# Patient Record
Sex: MALE | Race: WHITE | NOT HISPANIC OR LATINO | Employment: FULL TIME | URBAN - METROPOLITAN AREA
[De-identification: names, ages, dates, MRNs, and addresses within clinical notes are randomized per-mention and may not be internally consistent; named-entity substitution may affect disease eponyms.]

---

## 2017-10-03 ENCOUNTER — OFFICE VISIT - HEALTHEAST (OUTPATIENT)
Dept: INTERNAL MEDICINE | Facility: CLINIC | Age: 37
End: 2017-10-03

## 2017-10-03 ENCOUNTER — COMMUNICATION - HEALTHEAST (OUTPATIENT)
Dept: INTERNAL MEDICINE | Facility: CLINIC | Age: 37
End: 2017-10-03

## 2017-10-03 DIAGNOSIS — M79.641 HAND PAIN, RIGHT: ICD-10-CM

## 2017-10-03 ASSESSMENT — MIFFLIN-ST. JEOR: SCORE: 2000.81

## 2017-12-01 ENCOUNTER — OFFICE VISIT - HEALTHEAST (OUTPATIENT)
Dept: INTERNAL MEDICINE | Facility: CLINIC | Age: 37
End: 2017-12-01

## 2017-12-01 DIAGNOSIS — E66.811 OBESITY (BMI 30.0-34.9): ICD-10-CM

## 2017-12-01 DIAGNOSIS — Z00.00 ROUTINE GENERAL MEDICAL EXAMINATION AT A HEALTH CARE FACILITY: ICD-10-CM

## 2017-12-01 DIAGNOSIS — R06.83 SNORING: ICD-10-CM

## 2017-12-01 DIAGNOSIS — G89.29 CHRONIC HEADACHES: ICD-10-CM

## 2017-12-01 DIAGNOSIS — R51.9 CHRONIC HEADACHES: ICD-10-CM

## 2017-12-01 LAB
CHOLEST SERPL-MCNC: 150 MG/DL
FASTING STATUS PATIENT QL REPORTED: ABNORMAL
HDLC SERPL-MCNC: 38 MG/DL
LDLC SERPL CALC-MCNC: 92 MG/DL
TRIGL SERPL-MCNC: 98 MG/DL

## 2017-12-01 ASSESSMENT — MIFFLIN-ST. JEOR: SCORE: 2032.56

## 2017-12-02 ENCOUNTER — COMMUNICATION - HEALTHEAST (OUTPATIENT)
Dept: INTERNAL MEDICINE | Facility: CLINIC | Age: 37
End: 2017-12-02

## 2017-12-02 ENCOUNTER — AMBULATORY - HEALTHEAST (OUTPATIENT)
Dept: INTERNAL MEDICINE | Facility: CLINIC | Age: 37
End: 2017-12-02

## 2017-12-02 DIAGNOSIS — D64.9 ANEMIA: ICD-10-CM

## 2018-07-25 ENCOUNTER — COMMUNICATION - HEALTHEAST (OUTPATIENT)
Dept: SCHEDULING | Facility: CLINIC | Age: 38
End: 2018-07-25

## 2018-07-26 ENCOUNTER — OFFICE VISIT - HEALTHEAST (OUTPATIENT)
Dept: INTERNAL MEDICINE | Facility: CLINIC | Age: 38
End: 2018-07-26

## 2018-07-26 DIAGNOSIS — L03.90 CELLULITIS: ICD-10-CM

## 2018-12-13 ENCOUNTER — OFFICE VISIT - HEALTHEAST (OUTPATIENT)
Dept: INTERNAL MEDICINE | Facility: CLINIC | Age: 38
End: 2018-12-13

## 2018-12-13 ENCOUNTER — COMMUNICATION - HEALTHEAST (OUTPATIENT)
Dept: INTERNAL MEDICINE | Facility: CLINIC | Age: 38
End: 2018-12-13

## 2018-12-13 DIAGNOSIS — R53.82 CHRONIC FATIGUE: ICD-10-CM

## 2018-12-13 DIAGNOSIS — R06.83 SNORING: ICD-10-CM

## 2018-12-13 DIAGNOSIS — G44.229 CHRONIC TENSION-TYPE HEADACHE, NOT INTRACTABLE: ICD-10-CM

## 2018-12-13 DIAGNOSIS — Z00.00 ROUTINE GENERAL MEDICAL EXAMINATION AT A HEALTH CARE FACILITY: ICD-10-CM

## 2018-12-13 DIAGNOSIS — E66.811 OBESITY (BMI 30.0-34.9): ICD-10-CM

## 2018-12-13 DIAGNOSIS — D64.9 ANEMIA, UNSPECIFIED TYPE: ICD-10-CM

## 2018-12-13 LAB
ALBUMIN SERPL-MCNC: 4.3 G/DL (ref 3.5–5)
ALP SERPL-CCNC: 38 U/L (ref 45–120)
ALT SERPL W P-5'-P-CCNC: 35 U/L (ref 0–45)
ANION GAP SERPL CALCULATED.3IONS-SCNC: 8 MMOL/L (ref 5–18)
AST SERPL W P-5'-P-CCNC: 20 U/L (ref 0–40)
BILIRUB SERPL-MCNC: 0.7 MG/DL (ref 0–1)
BUN SERPL-MCNC: 10 MG/DL (ref 8–22)
CALCIUM SERPL-MCNC: 9.4 MG/DL (ref 8.5–10.5)
CHLORIDE BLD-SCNC: 104 MMOL/L (ref 98–107)
CHOLEST SERPL-MCNC: 171 MG/DL
CO2 SERPL-SCNC: 26 MMOL/L (ref 22–31)
CREAT SERPL-MCNC: 0.75 MG/DL (ref 0.7–1.3)
ERYTHROCYTE [DISTWIDTH] IN BLOOD BY AUTOMATED COUNT: 12 % (ref 11–14.5)
FASTING STATUS PATIENT QL REPORTED: YES
FERRITIN SERPL-MCNC: 207 NG/ML (ref 27–300)
GFR SERPL CREATININE-BSD FRML MDRD: >60 ML/MIN/1.73M2
GLUCOSE BLD-MCNC: 97 MG/DL (ref 70–125)
HCT VFR BLD AUTO: 42.4 % (ref 40–54)
HDLC SERPL-MCNC: 46 MG/DL
HGB BLD-MCNC: 14.3 G/DL (ref 14–18)
IRON SATN MFR SERPL: 44 % (ref 20–50)
IRON SERPL-MCNC: 134 UG/DL (ref 42–175)
LDLC SERPL CALC-MCNC: 112 MG/DL
MCH RBC QN AUTO: 30.7 PG (ref 27–34)
MCHC RBC AUTO-ENTMCNC: 33.9 G/DL (ref 32–36)
MCV RBC AUTO: 91 FL (ref 80–100)
PLATELET # BLD AUTO: 317 THOU/UL (ref 140–440)
PMV BLD AUTO: 7.6 FL (ref 7–10)
POTASSIUM BLD-SCNC: 3.9 MMOL/L (ref 3.5–5)
PROT SERPL-MCNC: 7.2 G/DL (ref 6–8)
RBC # BLD AUTO: 4.67 MILL/UL (ref 4.4–6.2)
SODIUM SERPL-SCNC: 138 MMOL/L (ref 136–145)
TIBC SERPL-MCNC: 308 UG/DL (ref 313–563)
TRANSFERRIN SERPL-MCNC: 247 MG/DL (ref 212–360)
TRIGL SERPL-MCNC: 67 MG/DL
TSH SERPL DL<=0.005 MIU/L-ACNC: 1.78 UIU/ML (ref 0.3–5)
VIT B12 SERPL-MCNC: 430 PG/ML (ref 213–816)
WBC: 8.5 THOU/UL (ref 4–11)

## 2018-12-13 ASSESSMENT — MIFFLIN-ST. JEOR: SCORE: 2118.74

## 2019-03-07 ENCOUNTER — OFFICE VISIT - HEALTHEAST (OUTPATIENT)
Dept: INTERNAL MEDICINE | Facility: CLINIC | Age: 39
End: 2019-03-07

## 2019-03-07 ENCOUNTER — COMMUNICATION - HEALTHEAST (OUTPATIENT)
Dept: SCHEDULING | Facility: CLINIC | Age: 39
End: 2019-03-07

## 2019-03-07 DIAGNOSIS — J10.1 INFLUENZA A: ICD-10-CM

## 2019-03-07 DIAGNOSIS — R05.9 COUGH: ICD-10-CM

## 2019-03-07 LAB
FLUAV AG SPEC QL IA: ABNORMAL
FLUBV AG SPEC QL IA: ABNORMAL

## 2019-03-07 ASSESSMENT — MIFFLIN-ST. JEOR: SCORE: 2123.28

## 2019-03-08 ENCOUNTER — AMBULATORY - HEALTHEAST (OUTPATIENT)
Dept: INTERNAL MEDICINE | Facility: CLINIC | Age: 39
End: 2019-03-08

## 2020-01-02 ENCOUNTER — OFFICE VISIT - HEALTHEAST (OUTPATIENT)
Dept: INTERNAL MEDICINE | Facility: CLINIC | Age: 40
End: 2020-01-02

## 2020-01-02 DIAGNOSIS — G89.29 CHRONIC ELBOW PAIN, RIGHT: ICD-10-CM

## 2020-01-02 DIAGNOSIS — Z13.220 ENCOUNTER FOR SCREENING FOR LIPOID DISORDERS: ICD-10-CM

## 2020-01-02 DIAGNOSIS — M25.521 CHRONIC ELBOW PAIN, RIGHT: ICD-10-CM

## 2020-01-02 DIAGNOSIS — R06.83 SNORING: ICD-10-CM

## 2020-01-02 DIAGNOSIS — E66.811 OBESITY (BMI 30.0-34.9): ICD-10-CM

## 2020-01-02 DIAGNOSIS — Z00.00 ROUTINE GENERAL MEDICAL EXAMINATION AT A HEALTH CARE FACILITY: ICD-10-CM

## 2020-01-02 LAB
ALBUMIN SERPL-MCNC: 4.3 G/DL (ref 3.5–5)
ALBUMIN UR-MCNC: NEGATIVE MG/DL
ALP SERPL-CCNC: 49 U/L (ref 45–120)
ALT SERPL W P-5'-P-CCNC: 23 U/L (ref 0–45)
ANION GAP SERPL CALCULATED.3IONS-SCNC: 12 MMOL/L (ref 5–18)
APPEARANCE UR: CLEAR
AST SERPL W P-5'-P-CCNC: 16 U/L (ref 0–40)
BACTERIA #/AREA URNS HPF: ABNORMAL HPF
BILIRUB SERPL-MCNC: 0.6 MG/DL (ref 0–1)
BILIRUB UR QL STRIP: NEGATIVE
BUN SERPL-MCNC: 8 MG/DL (ref 8–22)
CALCIUM SERPL-MCNC: 9.3 MG/DL (ref 8.5–10.5)
CHLORIDE BLD-SCNC: 105 MMOL/L (ref 98–107)
CHOLEST SERPL-MCNC: 171 MG/DL
CO2 SERPL-SCNC: 23 MMOL/L (ref 22–31)
COLOR UR AUTO: YELLOW
CREAT SERPL-MCNC: 0.76 MG/DL (ref 0.7–1.3)
ERYTHROCYTE [DISTWIDTH] IN BLOOD BY AUTOMATED COUNT: 12.2 % (ref 11–14.5)
FASTING STATUS PATIENT QL REPORTED: YES
GFR SERPL CREATININE-BSD FRML MDRD: >60 ML/MIN/1.73M2
GLUCOSE BLD-MCNC: 89 MG/DL (ref 70–125)
GLUCOSE UR STRIP-MCNC: NEGATIVE MG/DL
HCT VFR BLD AUTO: 40.5 % (ref 40–54)
HDLC SERPL-MCNC: 42 MG/DL
HGB BLD-MCNC: 14.2 G/DL (ref 14–18)
HGB UR QL STRIP: ABNORMAL
KETONES UR STRIP-MCNC: NEGATIVE MG/DL
LDLC SERPL CALC-MCNC: 112 MG/DL
LEUKOCYTE ESTERASE UR QL STRIP: NEGATIVE
MCH RBC QN AUTO: 31.2 PG (ref 27–34)
MCHC RBC AUTO-ENTMCNC: 35 G/DL (ref 32–36)
MCV RBC AUTO: 89 FL (ref 80–100)
NITRATE UR QL: NEGATIVE
PH UR STRIP: 6 [PH] (ref 5–8)
PLATELET # BLD AUTO: 291 THOU/UL (ref 140–440)
PMV BLD AUTO: 7.8 FL (ref 7–10)
POTASSIUM BLD-SCNC: 3.7 MMOL/L (ref 3.5–5)
PROT SERPL-MCNC: 7.3 G/DL (ref 6–8)
RBC # BLD AUTO: 4.54 MILL/UL (ref 4.4–6.2)
RBC #/AREA URNS AUTO: ABNORMAL HPF
SODIUM SERPL-SCNC: 140 MMOL/L (ref 136–145)
SP GR UR STRIP: 1.02 (ref 1–1.03)
SQUAMOUS #/AREA URNS AUTO: ABNORMAL LPF
TRIGL SERPL-MCNC: 83 MG/DL
UROBILINOGEN UR STRIP-ACNC: ABNORMAL
WBC #/AREA URNS AUTO: ABNORMAL HPF
WBC: 10.1 THOU/UL (ref 4–11)

## 2020-01-02 ASSESSMENT — MIFFLIN-ST. JEOR: SCORE: 2118.74

## 2020-01-03 ENCOUNTER — COMMUNICATION - HEALTHEAST (OUTPATIENT)
Dept: INTERNAL MEDICINE | Facility: CLINIC | Age: 40
End: 2020-01-03

## 2020-09-22 ENCOUNTER — COMMUNICATION - HEALTHEAST (OUTPATIENT)
Dept: INTERNAL MEDICINE | Facility: CLINIC | Age: 40
End: 2020-09-22

## 2020-10-01 ENCOUNTER — RECORDS - HEALTHEAST (OUTPATIENT)
Dept: ADMINISTRATIVE | Facility: OTHER | Age: 40
End: 2020-10-01

## 2021-01-07 ENCOUNTER — OFFICE VISIT - HEALTHEAST (OUTPATIENT)
Dept: INTERNAL MEDICINE | Facility: CLINIC | Age: 41
End: 2021-01-07

## 2021-01-07 DIAGNOSIS — Z00.00 ROUTINE GENERAL MEDICAL EXAMINATION AT A HEALTH CARE FACILITY: ICD-10-CM

## 2021-01-07 DIAGNOSIS — Z13.220 ENCOUNTER FOR SCREENING FOR LIPOID DISORDERS: ICD-10-CM

## 2021-01-07 DIAGNOSIS — E66.811 OBESITY (BMI 30.0-34.9): ICD-10-CM

## 2021-01-07 DIAGNOSIS — G89.29 CHRONIC PAIN OF RIGHT KNEE: ICD-10-CM

## 2021-01-07 DIAGNOSIS — M25.561 CHRONIC PAIN OF RIGHT KNEE: ICD-10-CM

## 2021-01-07 LAB
ALBUMIN SERPL-MCNC: 4.3 G/DL (ref 3.5–5)
ALBUMIN UR-MCNC: NEGATIVE MG/DL
ALP SERPL-CCNC: 44 U/L (ref 45–120)
ALT SERPL W P-5'-P-CCNC: 27 U/L (ref 0–45)
ANION GAP SERPL CALCULATED.3IONS-SCNC: 6 MMOL/L (ref 5–18)
APPEARANCE UR: CLEAR
AST SERPL W P-5'-P-CCNC: 19 U/L (ref 0–40)
BILIRUB SERPL-MCNC: 0.7 MG/DL (ref 0–1)
BILIRUB UR QL STRIP: NEGATIVE
BUN SERPL-MCNC: 11 MG/DL (ref 8–22)
CALCIUM SERPL-MCNC: 9.4 MG/DL (ref 8.5–10.5)
CHLORIDE BLD-SCNC: 103 MMOL/L (ref 98–107)
CHOLEST SERPL-MCNC: 166 MG/DL
CO2 SERPL-SCNC: 30 MMOL/L (ref 22–31)
COLOR UR AUTO: YELLOW
CREAT SERPL-MCNC: 0.79 MG/DL (ref 0.7–1.3)
ERYTHROCYTE [DISTWIDTH] IN BLOOD BY AUTOMATED COUNT: 11.5 % (ref 11–14.5)
FASTING STATUS PATIENT QL REPORTED: NORMAL
GFR SERPL CREATININE-BSD FRML MDRD: >60 ML/MIN/1.73M2
GLUCOSE BLD-MCNC: 92 MG/DL (ref 70–125)
GLUCOSE UR STRIP-MCNC: NEGATIVE MG/DL
HCT VFR BLD AUTO: 44.6 % (ref 40–54)
HDLC SERPL-MCNC: 40 MG/DL
HGB BLD-MCNC: 14.8 G/DL (ref 14–18)
HGB UR QL STRIP: NEGATIVE
KETONES UR STRIP-MCNC: NEGATIVE MG/DL
LDLC SERPL CALC-MCNC: 107 MG/DL
LEUKOCYTE ESTERASE UR QL STRIP: NEGATIVE
MCH RBC QN AUTO: 30.5 PG (ref 27–34)
MCHC RBC AUTO-ENTMCNC: 33.3 G/DL (ref 32–36)
MCV RBC AUTO: 92 FL (ref 80–100)
NITRATE UR QL: NEGATIVE
PH UR STRIP: 6 [PH] (ref 5–8)
PLATELET # BLD AUTO: 274 THOU/UL (ref 140–440)
PMV BLD AUTO: 7.6 FL (ref 7–10)
POTASSIUM BLD-SCNC: 4.2 MMOL/L (ref 3.5–5)
PROT SERPL-MCNC: 6.9 G/DL (ref 6–8)
RBC # BLD AUTO: 4.86 MILL/UL (ref 4.4–6.2)
SODIUM SERPL-SCNC: 139 MMOL/L (ref 136–145)
SP GR UR STRIP: 1.02 (ref 1–1.03)
TRIGL SERPL-MCNC: 93 MG/DL
UROBILINOGEN UR STRIP-ACNC: NORMAL
WBC: 8.4 THOU/UL (ref 4–11)

## 2021-01-07 ASSESSMENT — MIFFLIN-ST. JEOR: SCORE: 2111.94

## 2021-01-20 ENCOUNTER — RECORDS - HEALTHEAST (OUTPATIENT)
Dept: ADMINISTRATIVE | Facility: OTHER | Age: 41
End: 2021-01-20

## 2021-05-31 VITALS — HEIGHT: 72 IN | BODY MASS INDEX: 32.23 KG/M2 | WEIGHT: 238 LBS

## 2021-05-31 VITALS — WEIGHT: 231 LBS | BODY MASS INDEX: 31.29 KG/M2 | HEIGHT: 72 IN

## 2021-06-02 VITALS — HEIGHT: 72 IN | WEIGHT: 258 LBS | BODY MASS INDEX: 34.95 KG/M2

## 2021-06-02 VITALS — BODY MASS INDEX: 34.81 KG/M2 | HEIGHT: 72 IN | WEIGHT: 257 LBS

## 2021-06-04 VITALS
DIASTOLIC BLOOD PRESSURE: 84 MMHG | TEMPERATURE: 98 F | WEIGHT: 257 LBS | HEART RATE: 82 BPM | HEIGHT: 72 IN | OXYGEN SATURATION: 97 % | SYSTOLIC BLOOD PRESSURE: 130 MMHG | BODY MASS INDEX: 34.81 KG/M2

## 2021-06-04 NOTE — PATIENT INSTRUCTIONS - HE
Try Delsym cough syrup for current upper respiratory infection  Update me in 2 weeks if your right elbow is not much improved with Aleve twice daily for 10 days  Consider getting an annual flu shot from your pharmacy

## 2021-06-05 VITALS
HEIGHT: 72 IN | WEIGHT: 255.5 LBS | OXYGEN SATURATION: 99 % | DIASTOLIC BLOOD PRESSURE: 80 MMHG | HEART RATE: 73 BPM | BODY MASS INDEX: 34.61 KG/M2 | SYSTOLIC BLOOD PRESSURE: 128 MMHG

## 2021-06-11 NOTE — TELEPHONE ENCOUNTER
Patient's wife sent a My Chart message regarding her  in her chart. The following is the information from her chart:    Tomasz ZAFAR.  My  Dean is your patient.  He's been having some bad pain in his joints like his knee and shoulder. Can we set up an appointment? I tried calling but was on hold for 30 minutes    You have no upcoming appointments. Have him see another provider? Fit him in?     Fozia Blanco CMA    Please contact patient and help to schedule appointment with me or available provider if I have nothing open immediately    I called and spoke the Lisa and scheduled a VV with PCP. Patient did not want to wait as long as it was for a in person with PCP.    Fozia Blanco CMA

## 2021-06-13 NOTE — PROGRESS NOTES
Office Visit - Follow Up   Peewee Walsh   37 y.o. male    Date of Visit: 10/3/2017    Chief Complaint   Patient presents with     Hand Pain     right, hurts to grab things        Assessment and Plan   1. Hand pain, right  I suspect this is tendinitis or injury to 1 of his muscles or ligaments in his hand.  Less likely carpal tunnel or radicular pain.  Recommending Aleve twice daily for the next 7-10 days and to avoid any use.  He may also apply ice.  Orthopedic hand consult if no significant improvement in the next 72 hours.    No Follow-up on file.     History of Present Illness   This 37 y.o. old healthy male is here with pain in his right hand that began over the last 3 days.  He awoke with severe pain in his right hand 3 days ago.  It is localized to his palm just distal to his wrist in the midline.  Nothing radiating into his fingers.  The pain is worse when he is grabbing objects.  It hurts more with his fourth and fifth fingers then with his index finger and thumb.  No redness, warmth, or swelling.  No history of carpal tunnel syndrome.  He does have history of nerve injury involving right shoulder but those symptoms are very different from current.  He has not tried any NSAIDs.  He was using a circular saw the day prior to onset of symptoms.  He cannot think of any other injury.  No trauma.    Review of Systems: Denies shoulder pain.  No pain radiating down arm currently     Medications, Allergies and Problem List   Patient Active Problem List   Diagnosis     Chronic right shoulder pain     Acute cervical radiculopathy     Elevated blood pressure     Plantar fasciitis, bilateral     Left foot pain     Hand pain, right       He has a past surgical history that includes [place] tooth extraction.    No Known Allergies    Current Outpatient Prescriptions   Medication Sig Dispense Refill     multivitamin therapeutic (THERAGRAN) tablet Take 1 tablet by mouth daily.       No current facility-administered  medications for this visit.         Physical Exam   General Appearance:   Well-appearing male    /80 (Patient Site: Left Arm, Patient Position: Sitting, Cuff Size: Adult Regular)  Pulse (!) 104  Ht 6' (1.829 m)  Wt (!) 231 lb (104.8 kg)  SpO2 (!) 9%  BMI 31.33 kg/m2      Focal reproducible tenderness involving and just distal to wrist and midline.  No palpable mass  Neurologically intact.  Normal strength.  Negative Tinel sign.  No tenderness over ulnar nerve     Additional Information   Social History   Substance Use Topics     Smoking status: Never Smoker     Smokeless tobacco: Never Used     Alcohol use None              Earnest Hein MD

## 2021-06-14 NOTE — PROGRESS NOTES
MALE PREVENTATIVE EXAM    Assessment and Plan:     Patient has been advised of split billing requirements and indicates understanding: Yes    1. Routine general medical examination at a health care facility  Immunizations are reviewed and providing flu shot and updating his tetanus.  Living will has been discussed.  Non-smoker.  Uses alcohol in moderation.  Regular exercise discussed.  Colonoscopy at age 51 and begin prostate cancer screening at age 41.   He sees his ophthalmologist every year. Skin exam performed and recommending regular use of sunblock.  Will screen for diabetes with fasting glucose.  Checking fasting lipid profile.    - Comprehensive Metabolic Panel  - Urinalysis-UC if Indicated  - HM2(CBC w/o Differential)    2. Obesity (BMI 30.0-34.9)  Emphasizing the importance of diet modification and more regular exercise    3. Chronic pain of right knee  Evaluated by [facility] with abnormal MRI and discussing possible surgical intervention.  Will obtain MRI for review    4. Encounter for screening for lipoid disorders    - Lipid [place]- FASTING        Next follow up:  Return in 1 year (on 1/7/2022) for Annual physical.    Immunization Review  Adult Imm Review: Flu shot and TD    I discussed the following with the patient:   Adult Healthy Living: Importance of regular exercise  Healthy nutrition        Subjective:   Chief Complaint: Peewee Walhs is an 40 y.o. male here for a preventative health visit.    Patient has been advised of split billing requirements and indicates understanding: Yes  HPI: Here for his annual physical.  Chronic pain involving right knee.  Recent evaluation at [facility] with abnormal MRI and discussion underway regarding possible surgical intervention    Healthy Habits  Are you taking a daily aspirin? No  Do you typically exercising at least 40 min, 3-4 times per week?  Yes  Do you usually eat at least 4 servings of fruit and vegetables a day, include whole  grains and fiber and avoid regularly eating high fat foods? NO  Have you had an eye exam in the past two years? Yes  Do you see a dentist twice per year? NO  Do you have any concerns regarding sleep? No    Safety Screen    No data recorded    Review of Systems:  Please see above.  The rest of the review of systems are negative for all systems.     Cancer Screening     Patient has no health maintenance due at this time              History     Reviewed By Date/Time Sections Reviewed    Earnest Hein MD 1/7/2021  1:51 PM Tobacco, Alcohol, Drug Use, Family    Earnest Hein MD 1/7/2021  1:41 PM Medical    Bloch, Lisa M, CMA 1/7/2021  1:36 PM Tobacco            Objective:   Vital Signs:   Visit Vitals  /80   Pulse 73   Ht 6' (1.829 m)   Wt (!) 255 lb 8 oz (115.9 kg)   SpO2 99%   BMI 34.65 kg/m           PHYSICAL EXAM  EYES: Eyelids, conjunctiva, and sclera were normal. Pupils were normal. Cornea, iris, and lens were normal bilaterally.  HEAD, EARS, NOSE, MOUTH, AND THROAT: Head and face were normal. TMs and external auditory canals are normal  NECK: Neck appearance was normal. There were no neck masses and the thyroid was not enlarged and no nodules are felt.  No lymphadenopathy.  RESPIRATORY: Breathing pattern was normal and the chest moved symmetrically.   Lung sounds were normal and there were no rales or wheezes.  CARDIOVASCULAR: Heart rate and rhythm were normal.  S1 and S2 were normal and there were no extra sounds or murmurs. Peripheral pulses in arms and legs were normal.  Jugular venous pressure was normal.  There was no peripheral edema.  No carotid bruits.  GASTROINTESTINAL:  Bowel sounds were present.   Palpation detected no tenderness, mass, or enlarged organs.   GENITALIA: No penile or testicular lesions.  No inguinal hernia.  MUSCULOSKELETAL: Protuberance over right knee and patella tendon insertion.  Minimally tender.  LYMPHATIC: There were no enlarged nodes.  SKIN/HAIR/NAILS: Skin  color was normal.  Hair and nails were normal.There were no skin lesions.  NEUROLOGIC: The patient was alert and oriented to person, place, time, and circumstance. Speech was normal. Cranial nerves were normal. Motor strength was normal for age. The patient was normally coordinated.  Sensation intact.  PSYCHIATRIC:  Mood and affect were normal and the patient had normal recent and remote memory. The patient's judgment and insight were normal.           Medication List      as of January 7, 2021  2:13 PM     You have not been prescribed any medications.         Additional Screenings Completed Today:     The patient was counseled and encouraged to consider modifying their diet and eating habits. He was provided with information on recommended healthy diet options.

## 2021-06-14 NOTE — PROGRESS NOTES
Office Visit - Physical   ePewee Walsh   37 y.o.  male    Date of visit: 12/1/2017  Physician: Earnest Hein MD     Assessment and Plan   1. Routine general medical examination at a health care facility  Immunizations are reviewed and he declines having a flu shot.  Non-smoker.  Uses alcohol in moderation.  Regular exercise discussed.   He sees his ophthalmologist regularly.  Skin exam performed and recommending regular use of sunblock.   Will screen for diabetes with fasting glucose.  Checking fasting lipid profile.      - Lipid [place]  - Comprehensive Metabolic Panel  - Hemoglobin  - Urinalysis    2. Obesity (BMI 30.0-34.9)  Exclude hypothyroidism as he has a difficult time keeping the weight off.  He struggles with diet control.  He does use the Lose It Zulma.  Encouraging regular exercise.  - Thyroid Stimulating Hormone (TSH)    3. Chronic headaches  Consistent with tension headaches.  We discussed getting adequate sleep, maintaining good hydration, eating well, and decreasing stress.    4. Snoring  Nothing in his history to suggest sleep apnea.  I suggested that he ask his wife to pay attention to any apneic spells that may occur throughout the night.  This would increase our concern and would warrant a referral to the sleep clinic.    Return in about 1 year (around 12/1/2018) for Annual physical.     Chief Complaint   Chief Complaint   Patient presents with     Annual Exam        Patient Profile   Social History     Social History Narrative    , Lisa    No children    [employer]         Past Medical History   Patient Active Problem List   Diagnosis     Chronic right shoulder pain     Plantar fasciitis, bilateral     Obesity (BMI 30.0-34.9)     Chronic headaches     Snoring       Past Surgical History  He has a past surgical history that includes [place] tooth extraction.     History of Present Illness   This 37 y.o. old male is here for annual physical.  His wife has asked that he bring up  some concerns regarding occasional headaches and also problems with snoring.  She is worried about sleep apnea although no apneic spells have been mentioned during the night.  He feels relatively well rested in the morning and does not have hypersomnolence during the day.  The headaches are infrequent.  Usually resolves after some Tylenol.  No associated nausea and no vision changes.  Headaches are described as dull in nature.  There is some pain and tightness in his posterior neck as well.  He suspects related to stress.    Review of Systems: A comprehensive review of systems was negative except as noted.  General: No chronic fatigue, unexpected weight loss or weight gain, fevers, chills, or night sweats  Eyes: No significant change in vision.  Seeing ophthalmologist regularly.  ENT: No ear or sinus infections.  No change in hearing.  No tinnitus.  Respiratory: No wheezing, dyspnea on exertion, or chronic cough  Cardiovascular: No chest pain, palpitations, dizziness, or syncope.  No peripheral edema.  GI: No abdominal pain, reflux symptoms, dysphagia, nausea, vomiting, constipation, or diarrhea  : No change in frequency or incontinence.  No hematuria.  Skin: No new rashes or lesions.  Neurologic: Intermittent headaches.  No dizziness.  Musculoskeletal: No muscle or joint pain.  Lymphatic: No swollen lymph nodes  Psychiatric: No depression, anxiety, or sleep disorder.       Medications and Allergies   Current Outpatient Prescriptions   Medication Sig Dispense Refill     multivitamin therapeutic (THERAGRAN) tablet Take 1 tablet by mouth daily.       No current facility-administered medications for this visit.      No Known Allergies     Family and Social History   Family History   Problem Relation Age of Onset     Hypertension Mother      Heart failure Maternal Grandfather      Heart attack Paternal Grandmother      early 60s        Social History   Substance Use Topics     Smoking status: Never Smoker      Smokeless tobacco: Never Used     Alcohol use Yes      Comment: 1-2/ month        Physical Exam   General Appearance:   Overweight but otherwise well-appearing male    /80 (Patient Site: Left Arm, Patient Position: Sitting, Cuff Size: Adult Large)  Pulse 78  Ht 6' (1.829 m)  Wt (!) 238 lb (108 kg)  SpO2 95%  BMI 32.28 kg/m2    EYES: Eyelids, conjunctiva, and sclera were normal. Pupils were normal. Cornea, iris, and lens were normal bilaterally.  HEAD, EARS, NOSE, MOUTH, AND THROAT: Head and face were normal. Nose appearance was normal and there was no discharge. Oropharynx was normal.  NECK: Neck appearance was normal. There were no neck masses and the thyroid was not enlarged and no nodules are felt.  No lymphadenopathy.  RESPIRATORY: Breathing pattern was normal and the chest moved symmetrically.  Percussion/auscultatory percussion was normal.  Lung sounds were normal and there were no rales or wheezes.  CARDIOVASCULAR: Heart rate and rhythm were normal.  S1 and S2 were normal and there were no extra sounds or murmurs. Peripheral pulses in arms and legs were normal.  Jugular venous pressure was normal.  There was no peripheral edema.    GASTROINTESTINAL: The abdomen was normal in contour.  Bowel sounds were present.  Percussion detected no organ enlargement or tenderness.  Palpation detected no tenderness, mass, or enlarged organs.   GENITALIA: No penile or testicular lesions.  No inguinal hernia.  MUSCULOSKELETAL: Skeletal configuration was normal and muscle mass was normal for age. Joint appearance was overall normal.  LYMPHATIC: There were no enlarged nodes.  SKIN/HAIR/NAILS: Skin color was normal.  There were no skin lesions.  Hair and nails were normal.  NEUROLOGIC: The patient was alert and oriented to person, place, time, and circumstance. Speech was normal. Cranial nerves were normal. Motor strength was normal for age. The patient was normally coordinated.  Sensation intact.  PSYCHIATRIC:  Mood  and affect were normal and the patient had normal recent and remote memory. The patient's judgment and insight were normal.       Additional Information        Earnest Hein MD  Internal Medicine  Contact me at [phone]

## 2021-06-16 PROBLEM — E66.811 OBESITY (BMI 30.0-34.9): Status: ACTIVE | Noted: 2017-12-01

## 2021-06-16 PROBLEM — G89.29 CHRONIC HEADACHES: Status: ACTIVE | Noted: 2017-12-01

## 2021-06-16 PROBLEM — R53.82 CHRONIC FATIGUE: Status: ACTIVE | Noted: 2018-12-13

## 2021-06-16 PROBLEM — M25.561 CHRONIC PAIN OF RIGHT KNEE: Status: ACTIVE | Noted: 2021-01-07

## 2021-06-16 PROBLEM — R51.9 CHRONIC HEADACHES: Status: ACTIVE | Noted: 2017-12-01

## 2021-06-16 PROBLEM — R06.83 SNORING: Status: ACTIVE | Noted: 2017-12-01

## 2021-06-16 PROBLEM — G89.29 CHRONIC PAIN OF RIGHT KNEE: Status: ACTIVE | Noted: 2021-01-07

## 2021-06-18 NOTE — PATIENT INSTRUCTIONS - HE
Patient Instructions by Earnest Hein MD at 1/7/2021  1:40 PM     Author: Earnest Hein MD Service: -- Author Type: Physician    Filed: 1/7/2021  2:07 PM Encounter Date: 1/7/2021 Status: Addendum    : Earnest Hein MD (Physician)    Related Notes: Original Note by Earnest Hein MD (Physician) filed at 1/7/2021  2:06 PM         Patient Education   Understanding USDA MyPlate  The USDA (US Department of Agriculture) has guidelines to help you make healthy food choices. These are called MyPlate. MyPlate shows the food groups that make up healthy meals using the image of a place setting. Before you eat, think about the healthiest choices for what to put onto your plate or into your cup or bowl. To learn more about building a healthy plate, visit www.choosemyplate.gov.       The Food Groups    Fruits: Any fruit or 100% fruit juice counts as part of the Fruit Group. Fruits may be fresh, canned, frozen, or dried, and may be whole, cut-up, or pureed. Make half your plate fruits and vegetables.    Vegetables: Any vegetable or 100% vegetable juice counts as a member of the Vegetable Group. Vegetables may be fresh, frozen, canned, or dried. They can be served raw or cooked and may be whole, cut-up, or mashed. Make half your plate fruits and vegetables.     Grains: All foods made from grains are part of the Grains Group. These include wheat, rice, oats, cornmeal, and barley such as bread, pasta, oatmeal, cereal, tortillas, and grits. Grains should be no more than a quarter of your plate. At least half of your grains should be whole grains.    Protein: This group includes meat, poultry, seafood, beans and peas, eggs, processed soy products (like tofu), nuts (including nut butters), and seeds. Make protein choices no more than a quarter of your plate. Meat and poultry choices should be lean or low fat.    Dairy: All fluid milk products and foods made from milk that contain calcium, like yogurt  and cheese are part of the Dairy Group. (Foods that have little calcium, such as cream, butter, and cream cheese, are not part of the group.) Most dairy choices should be low-fat or fat-free.    Oils: These are fats that are liquid at room temperature. They include canola, corn, olive, soybean, and sunflower oil. Foods that are mainly oil include mayonnaise, certain salad dressings, and soft margarines. You should have only 5 to 7 teaspoons of oils a day. You probably already get this much from the food you eat.  Use [employer] [place] to Help Build Your Meals  The [employer] can help you plan and track your meals and activity. You can look up individual foods to see or compare their nutritional value. You can get guidelines for what and how much you should eat. You can compare your food choices. And you can assess personal physical activities and see ways you can improve. Go to www.WISHI.gov/[employer]/.    5177-1562 [employer]. [address] [place] [address]. All rights reserved. This information is not intended as a substitute for professional medical care. Always follow your healthcare professional's instructions.             Patient Education     Exercise for a Healthier Heart     Exercise with a friend. When activity is fun, you're more likely to stick with it.   You may wonder how you can improve the health of your heart. If youre thinking about exercise, youre on the right track. You dont need to become an athlete. But you do need a certain amount of brisk exercise to help strengthen your heart. If you have been diagnosed with a heart condition, your healthcare provider may advise exercise to help stabilize your condition. To help make exercise a habit, choose safe, fun activities.  Before you start  Check with your healthcare provider before starting an exercise program. This is especially important if you have not been active for a while. It's also important if you have a  long-term (chronic) health problem such as heart disease, diabetes, or obesity. Or if you are at high risk for having these problems.  Why exercise?  Exercising regularly offers many healthy rewards. It can help you do all of the following:    Improve your blood cholesterol level to help prevent further heart trouble    Lower your blood pressure to help prevent a stroke or heart attack    Control diabetes, or reduce your risk of getting this disease    Improve your heart and lung function    Reach and stay at a healthy weight    Make your muscles stronger so you can stay active    Prevent falls and fractures by slowing the loss of bone mass (osteoporosis)    Manage stress better    Reduce your blood pressure    Improve your sense of self and your body image  Exercise tips      Ease into your routine. Set small goals. Then build on them. If you are not sure what your activity level should be, talk with your healthcare provider first before starting an exercise routine.    Exercise on most days. Aim for a total of 150 minutes (2 hours and 30 minutes) or more of moderate-intensity aerobic activity each week. Or 75 minutes (1 hour and 15 minutes) or more of vigorous-intensity aerobic activity each week. Or try for a combination of both. Moderate activity means that you breathe heavier and your heart rate increases but you can still talk. Think about doing 40 minutes of moderate exercise, 3 to 4 times a week. For best results, activity should last for about 40 minutes to lower blood pressure and cholesterol. It's OK to work up to the 40-minute period over time. Examples of moderate-intensity activity are walking 1 mile in 15 minutes. Or doing 30 to 45 minutes of yard work.    Step up your daily activity level. Along with your exercise program, try being more active the whole day. Walk instead of drive. Or park further away so that you take more steps each day. Do more household tasks or yard work. You may not be able to  meet the advised mount of physical activity. But doing some moderate- or vigorous-intensity aerobic activity can help reduce your risk for heart disease. Your healthcare provider can help you figure out what is best for you.    Choose 1 or more activities you enjoy. Walking is one of the easiest things you can do. You can also try swimming, riding a bike, dancing, or taking an exercise class.    When to call your healthcare provider  Call your healthcare provider if you have any of these:     Chest pain or feel dizzy or lightheaded    Burning, tightness, pressure, or heaviness in your chest, neck, shoulders, back, or arms    Abnormal shortness of breath    More joint or muscle pain    A very fast or irregular heartbeat (palpitations)  Date Last Reviewed: 7/1/2019 2000-2019 [employer]. [address] [place] [address], [place] [address]. All rights reserved. This information is not intended as a substitute for professional medical care. Always follow your healthcare professional's instructions.

## 2021-06-19 NOTE — PROGRESS NOTES
1. Cellulitis        Medications Ordered   Medications     cephalexin (KEFLEX) 500 MG capsule     Sig: Take 1 capsule (500 mg total) by mouth 3 (three) times a day for 10 days.     Dispense:  30 capsule     Refill:  0      Plan: Antibiotics given as noted above.  I told him to follow-up if things get worse, meaning he gets a high fever, worsening redness, spreading proximally, or if any drainage develops.  I think this will take care of it nicely, and told him that he would be better off elevating his leg as well, but he has to work the next couple of days.  I offered him some time off but he wishes to continue working if he can.    Subjective: 38-year-old male who is here today with leg redness.  He states that he had a bite of some sort on the right anterior knee a few weeks ago that has persisted however has gotten smaller.  Yesterday, he noticed that his anterior shin was getting quite red and warm and tender to the touch.  This got worse in the evening called in and make an appointment to be seen today.  No fevers or chills.  No drainage.  There is nothing proximal to the knee.    Objective: Well-appearing male in no acute distress.  Vital signs as noted.  Chest clear heart regular rate and rhythm.  The right leg shows the round bite just inferior to the patella margin, that seems to be fine, but that he has an area of cellulitis that extends down the shin on that right leg is red and tender and warm.

## 2021-06-20 NOTE — LETTER
Letter by Earnest Hein MD at      Author: Earnest Hein MD Service: -- Author Type: --    Filed:  Encounter Date: 1/3/2020 Status: Signed         Peewee Walsh  [id number] [address] [address]             January 3, 2020         Dear Dean,    Below are the results from your recent visit:    Resulted Orders   Lipid [place]- FASTING   Result Value Ref Range    Cholesterol 171 <=199 mg/dL    Triglycerides 83 <=149 mg/dL    HDL Cholesterol 42 >=40 mg/dL    LDL Calculated 112 <=129 mg/dL    Patient Fasting > 8hrs? Yes    Comprehensive Metabolic Panel   Result Value Ref Range    Sodium 140 136 - 145 mmol/L    Potassium 3.7 3.5 - 5.0 mmol/L    Chloride 105 98 - 107 mmol/L    CO2 23 22 - 31 mmol/L    Anion Gap, Calculation 12 5 - 18 mmol/L    Glucose 89 70 - 125 mg/dL    BUN 8 8 - 22 mg/dL    Creatinine 0.76 0.70 - 1.30 mg/dL    GFR MDRD Af Amer >60 >60 mL/min/1.73m2    GFR MDRD Non Af Amer >60 >60 mL/min/1.73m2    Bilirubin, Total 0.6 0.0 - 1.0 mg/dL    Calcium 9.3 8.5 - 10.5 mg/dL    Protein, Total 7.3 6.0 - 8.0 g/dL    Albumin 4.3 3.5 - 5.0 g/dL    Alkaline Phosphatase 49 45 - 120 U/L    AST 16 0 - 40 U/L    ALT 23 0 - 45 U/L    Narrative    Fasting Glucose reference range is 70-99 mg/dL per  American Diabetes Association (ADA) guidelines.   HM2(CBC w/o Differential)   Result Value Ref Range    WBC 10.1 4.0 - 11.0 thou/uL    RBC 4.54 4.40 - 6.20 mill/uL    Hemoglobin 14.2 14.0 - 18.0 g/dL    Hematocrit 40.5 40.0 - 54.0 %    MCV 89 80 - 100 fL    MCH 31.2 27.0 - 34.0 pg    MCHC 35.0 32.0 - 36.0 g/dL    RDW 12.2 11.0 - 14.5 %    Platelets 291 140 - 440 thou/uL    MPV 7.8 7.0 - 10.0 fL   Urinalysis-UC if Indicated   Result Value Ref Range    Color, UA Yellow Colorless, Yellow, Straw, Light Yellow    Clarity, UA Clear Clear    Glucose, UA Negative Negative    Bilirubin, UA Negative Negative    Ketones, UA Negative Negative    Specific Gravity, UA 1.020 1.005 - 1.030    Blood, UA Trace (!)  Negative    pH, UA 6.0 5.0 - 8.0    Protein, UA Negative Negative mg/dL    Urobilinogen, UA 0.2 E.U./dL 0.2 E.U./dL, 1.0 E.U./dL    Nitrite, UA Negative Negative    Leukocytes, UA Negative Negative    Bacteria, UA None Seen None Seen hpf    RBC, UA 0-2 None Seen, 0-2 hpf    WBC, UA None Seen None Seen, 0-5 hpf    Squam Epithel, UA None Seen None Seen, 0-5 lpf    Narrative    UC not indicated       Your labs are looking good.  Cholesterol is excellent.  No diabetes with fasting glucose of 89.  Normal kidney function and normal liver studies.  No anemia.  Normal urinalysis.    Please call with questions or contact us using [employer].    Sincerely,        Electronically signed by Earnest Hein MD

## 2021-06-22 NOTE — PROGRESS NOTES
MALE PREVENTATIVE EXAM    Assessment and Plan:       1. Routine general medical examination at a health care facility  Past, family, and social history reviewed and updated.  Healthy habits discussed.  Appropriate preventative measures and counseling addressed.  Recommended screening tests discussed and ordered.  - Lipid [place]    2. Obesity (BMI 30.0-34.9)  Counseled regarding importance of weight loss to start watching calories more carefully and to get back into more regular exercise.    3. Anemia, unspecified type  Mild anemia noted last year.  Denies melena or hematochezia.  Check appropriate studies  - HM2(CBC w/o Differential)  - Iron and Transferrin Iron Binding Capacity  - Ferritin  - Vitamin B12    4. Chronic fatigue  He has noticed himself slightly more tired.  May be related to longer work hours.  In addition to rechecking hemoglobin, check appropriate metabolic studies  - Comprehensive Metabolic Panel  - Thyroid Stimulating Hormone (TSH)    5. Chronic tension-type headache, not intractable  Stable    6. Snoring  No other symptoms to suggest sleep apnea but will need to monitor as he is at increased risk        Next follow up:  Return in 1 year (on 12/13/2019) for Annual physical.    Immunization Review  Adult Imm Review: Declines immunizations today        I discussed the following with the patient:   Adult Healthy Living: Importance of regular exercise  Healthy nutrition  Weight loss referral options     The patient was counseled and encouraged to consider modifying their diet and eating habits. He was provided with information on recommended healthy diet options.        Subjective:   Chief Complaint: Peewee Walsh is an 38 y.o. male here for a preventative health visit.     HPI: Struggling to keep his weight under control.  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  Has noticed some mild fatigue.  Diagnosed with pneumonia earlier this year and also  cellulitis.  Both resolved with appropriate treatment.    Healthy Habits  Are you taking a daily aspirin? No  Do you typically exercising at least 40 min, 3-4 times per week?  Yes  Do you usually eat at least 4 servings of fruit and vegetables a day, include whole grains and fiber and avoid regularly eating high fat foods? NO  Have you had an eye exam in the past two years? Yes  Do you see a dentist twice per year? NO  Do you have any concerns regarding sleep? YES    Safety Screen  If you own firearms, are they secured in a locked gun cabinet or with trigger locks? The patient declines to answer  Do you feel you are safe where you are living?: Yes (12/13/2018 11:24 AM)  Do you feel you are safe in your relationship(s)?: Yes (12/13/2018 11:24 AM)      Review of Systems:  Please see above.  The rest of the review of systems are negative for all systems.     Cancer Screening     Patient has no health maintenance due at this time              History     Reviewed By Date/Time Sections Reviewed    Earnest Hein MD 12/13/2018 11:41 AM Social Documentation    Margie Rosario CMA 12/13/2018 11:25 AM Tobacco, Alcohol, Drug Use, Sexual Activity            Objective:   Vital Signs:   Visit Vitals  /84 (Patient Site: Left Arm, Patient Position: Sitting, Cuff Size: Adult Large)   Pulse 85   Ht 6' (1.829 m)   Wt (!) 257 lb (116.6 kg)   SpO2 96%   BMI 34.86 kg/m           PHYSICAL EXAM  EYES: Eyelids, conjunctiva, and sclera were normal. Pupils were normal. Cornea, iris, and lens were normal bilaterally.  HEAD, EARS, NOSE, MOUTH, AND THROAT: Head and face were normal. Nose appearance was normal and there was no discharge. Oropharynx was normal.  NECK: Neck appearance was normal. There were no neck masses and the thyroid was not enlarged and no nodules are felt.  No lymphadenopathy.  RESPIRATORY: Breathing pattern was normal and the chest moved symmetrically.  Percussion/auscultatory percussion was normal.  Lung  sounds were normal and there were no rales or wheezes.  CARDIOVASCULAR: Heart rate and rhythm were normal.  S1 and S2 were normal and there were no extra sounds or murmurs. Peripheral pulses in arms and legs were normal.  Jugular venous pressure was normal.  There was no peripheral edema.  No carotid bruits.  GASTROINTESTINAL: The abdomen was normal in contour.  Bowel sounds were present.   Palpation detected no tenderness, mass, or enlarged organs.   MUSCULOSKELETAL: Skeletal configuration was normal and muscle mass was normal for age. Joint appearance was overall normal.  LYMPHATIC: There were no enlarged nodes.  SKIN/HAIR/NAILS: Skin color was normal.  Hair and nails were normal.There were no skin lesions.  NEUROLOGIC: The patient was alert and oriented to person, place, time, and circumstance. Speech was normal. Cranial nerves were normal. Motor strength was normal for age. The patient was normally coordinated.  Sensation intact.  PSYCHIATRIC:  Mood and affect were normal and the patient had normal recent and remote memory. The patient's judgment and insight were normal.           Medication List      as of 12/13/2018  8:31 PM     You have not been prescribed any medications.         Additional Screenings Completed Today:

## 2021-06-24 NOTE — TELEPHONE ENCOUNTER
Who is calling:  Patient   Reason for Call:  Tamiflu Rx was sent to [facility] pharmacy,patient is requesting a transfer to [facility] pharmacy Ankur hagen from [facility].   Writer assisted with this by canceled the order at [facility] pharmacy and gave telephone order to [facility] pharmacy.     Date of last appointment with primary care: 3-7-19  Has the patient been recently seen:  Yes  Okay to leave a detailed message: Yes

## 2021-06-24 NOTE — TELEPHONE ENCOUNTER
"Pt calls to ask whether he can take Dayquil + Aleve together.  Has appt this AM for flu-like symptoms.  Generalized body aches + \"heavy cough\".  Discussed one Aleve tablet (NSAID) can be safely combined with acetaminophen for body aches as is done in [facility]; however encouraged pt to choose one or the other, so as to have an accurate assessment of symptoms at time of clinic appt.  Pt verbalizes understanding, however uncertain what pt will decide.    Monique Schultz RN BSBA  [facility] RN Triage     Reason for Disposition    Patient wants to be seen    Protocols used: INFLUENZA - SEASONAL-A-OH      "

## 2021-06-24 NOTE — PROGRESS NOTES
"Office Visit - Follow up    Peewee Walsh   38 y.o. male    Date of Visit: 3/7/2019    Chief Complaint   Patient presents with     Cough     Slight body aches, headache since yesterday morning       Subjective: 38-year-old patient with 18-hour history of myalgias severe headache generalized weakness malaise and fatigue.  No documented fever.  No significant respiratory complaints with the exception of a dry cough.  Indicates his lungs feel like \"sandpaper\"    Otherwise feels tired without documented fever or rigors.  Some upper respiratory stuffiness but no significant sore throat etc.    ROS: A comprehensive review of systems was performed and was otherwise negative except as mentioned above.     Exam  No adenopathy EENT negative lungs clear   /80 (Patient Site: Right Arm, Patient Position: Sitting)   Pulse 100   Temp 98.7  F (37.1  C)   Ht 6' (1.829 m)   Wt (!) 258 lb (117 kg)   BMI 34.99 kg/m      Assessment and Plan  Influenza A nasal swab is positive.    Discussed management of influenza A he will be treated with Tamiflu for 5 days    Peewee was seen today for cough.    Diagnoses and all orders for this visit:    Cough  -     Influenza A/B Rapid Test- Nasal Swab    Influenza A    Other orders  -     oseltamivir (TAMIFLU) 75 MG capsule; Take 1 capsule (75 mg total) by mouth 2 (two) times a day for 5 days.  -     oseltamivir (TAMIFLU) 75 MG capsule; Take 1 capsule (75 mg total) by mouth 2 (two) times a day for 5 days.          Time: total time spent with the patient was 20 minutes of which >50% was spent in counseling and coordination of care        No Known Allergies    Medications :  Prior to Admission medications    Medication Sig Start Date End Date Taking? Authorizing Provider   oseltamivir (TAMIFLU) 75 MG capsule Take 1 capsule (75 mg total) by mouth 2 (two) times a day for 5 days. 3/7/19 3/12/19  Jordan Todd MD        Past Medical History:   Past Medical History:   Diagnosis Date     " Acute cervical radiculopathy 11/23/2015     Anemia      Chronic headaches 12/1/2017     Chronic right shoulder pain 11/23/2015     Community acquired pneumonia     2018     Elevated blood pressure      Hand pain, right 10/3/2017    Tendinitis versus carpal tunnel     Left ankle injury 2010    High-grade tear anterior talofibular ligament     Left foot pain 11/23/2015     Obesity (BMI 30.0-34.9) 12/1/2017     Plantar fasciitis, bilateral 11/23/2015     Shoulder separation      Snoring 12/1/2017       Past Surgical History:   Past Surgical History:   Procedure Laterality Date     WISDOM TOOTH EXTRACTION         Social History:   Social History     Socioeconomic History     Marital status:      Spouse name: Not on file     Number of children: Not on file     Years of education: Not on file     Highest education level: Not on file   Occupational History     Not on file   Social Needs     Financial resource strain: Not on file     Food insecurity:     Worry: Not on file     Inability: Not on file     Transportation needs:     Medical: Not on file     Non-medical: Not on file   Tobacco Use     Smoking status: Never Smoker     Smokeless tobacco: Never Used   Substance and Sexual Activity     Alcohol use: Yes     Comment: 1-2/ month     Drug use: Not on file     Sexual activity: Not on file   Lifestyle     Physical activity:     Days per week: Not on file     Minutes per session: Not on file     Stress: Not on file   Relationships     Social connections:     Talks on phone: Not on file     Gets together: Not on file     Attends Confucianism service: Not on file     Active member of club or organization: Not on file     Attends meetings of clubs or organizations: Not on file     Relationship status: Not on file     Intimate partner violence:     Fear of current or ex partner: Not on file     Emotionally abused: Not on file     Physically abused: Not on file     Forced sexual activity: Not on file   Other Topics Concern      Not on file   Social History Narrative    , Lisa    No children    [employer] Manager       Family History:   Family History   Problem Relation Age of Onset     Hypertension Mother      Heart failure Maternal Grandfather      Heart attack Paternal Grandmother         early 60s         Jordan Tdod MD

## 2021-06-27 ENCOUNTER — HEALTH MAINTENANCE LETTER (OUTPATIENT)
Age: 41
End: 2021-06-27

## 2021-06-28 NOTE — PROGRESS NOTES
Progress Notes by Earnest Hein MD at 1/2/2020  2:40 PM     Author: Earnest Hein MD Service: -- Author Type: Physician    Filed: 1/2/2020  3:34 PM Encounter Date: 1/2/2020 Status: Signed    : Earnest Hein MD (Physician)       MALE PREVENTATIVE EXAM    Assessment and Plan:       1. Routine general medical examination at a health care facility  Immunizations are reviewed and updating tetanus.  Recommending flu shot but he is hesitant despite having confirmed influenza last year.  Living will discussed.  Non-smoker.  Uses alcohol in moderation.  Regular exercise discussed.  Colonoscopy at age 51.    Continue regular eye exams.  Skin exam performed and recommending regular use of sunblock.  Will screen for diabetes with fasting glucose.  Checking fasting lipid profile.      - Comprehensive Metabolic Panel  - HM2(CBC w/o Differential)  - Urinalysis-UC if Indicated    2. Chronic elbow pain, right  Suspect lateral epicondylitis.  Recommending Aleve twice daily with food for 10 days.  If no response, refer to orthopedics.  May benefit from PT.      3. Obesity (BMI 30.0-34.9)  We discussed the importance of more regular aerobic exercise and diet modification    4. Snoring  No symptoms to support sleep apnea.  We discussed using an appliance to decrease snoring and he can further discuss with his dentist who could refer him to an oral surgeon    5. Encounter for screening for lipoid disorders    - Lipid [place]- FASTING        Next follow up:  Return in 1 year (on 1/2/2021) for Annual physical.    Immunization Review  Adult Imm Review: Providing tetanus        I discussed the following with the patient:   Adult Healthy Living: Importance of regular exercise  Healthy nutrition    I have had an Advance Directives discussion with the patient.    Subjective:   Chief Complaint: Peewee Walsh is an 39 y.o. male here for a preventative health visit.     HPI: Here for his annual physical.  Pain  involving the lateral aspect of his right elbow for the past several months.  He does not recall any specific injury but it started after doing some go carting.  Denies any obvious repetitive use.  He has tried some occasional Aleve providing partial and temporary relief.    URI symptoms with hoarseness and chest congestion.  No dyspnea or wheezing    Healthy Habits  Are you taking a daily aspirin? No  Do you typically exercising at least 40 min, 3-4 times per week?  NO  Do you usually eat at least 4 servings of fruit and vegetables a day, include whole grains and fiber and avoid regularly eating high fat foods? NO  Have you had an eye exam in the past two years? Yes  Do you see a dentist twice per year? Yes  Do you have any concerns regarding sleep? No    Safety Screen    Do you feel you are safe where you are living?: Yes (1/2/2020  2:46 PM)  Do you feel you are safe in your relationship(s)?: Yes (1/2/2020  2:46 PM)      Review of Systems:  Please see above.  The rest of the review of systems are negative for all systems.     Cancer Screening     Patient has no health maintenance due at this time              History     Reviewed By Date/Time Sections Reviewed    Earnest Hein MD 1/2/2020  3:07 PM Medical, Surgical, Tobacco, Alcohol, Drug Use, Sexual Activity, Family, Social Documentation    Alejandra Alexis MA 1/2/2020  2:48 PM Tobacco            Objective:   Vital Signs:   Visit Vitals  /84 (Patient Site: Left Arm, Patient Position: Sitting, Cuff Size: Adult Regular)   Pulse 82   Temp 98  F (36.7  C)   Ht 6' (1.829 m)   Wt (!) 257 lb (116.6 kg)   SpO2 97%   BMI 34.86 kg/m           PHYSICAL EXAM  EYES: Eyelids, conjunctiva, and sclera were normal. Pupils were normal. Cornea, iris, and lens were normal bilaterally.  HEAD, EARS, NOSE, MOUTH, AND THROAT: Head and face were normal. Nose appearance was normal and there was no discharge. Oropharynx was normal.  NECK: Neck appearance was normal. There were  no neck masses and the thyroid was not enlarged and no nodules are felt.  No lymphadenopathy.  RESPIRATORY: Breathing pattern was normal and the chest moved symmetrically.  Percussion/auscultatory percussion was normal.  Lung sounds were normal and there were no rales or wheezes.  CARDIOVASCULAR: Heart rate and rhythm were normal.  S1 and S2 were normal and there were no extra sounds or murmurs. Peripheral pulses in arms and legs were normal.  Jugular venous pressure was normal.  There was no peripheral edema.  No carotid bruits.  GASTROINTESTINAL: The abdomen was normal in contour.  Bowel sounds were present.   Palpation detected no tenderness, mass, or enlarged organs.   GENITALIA: No penile or testicular lesions.  No inguinal hernia.  MUSCULOSKELETAL: Skeletal configuration was normal and muscle mass was normal for age. Joint appearance was overall normal.  LYMPHATIC: There were no enlarged nodes.  SKIN/HAIR/NAILS: Skin color was normal.  Hair and nails were normal.There were no skin lesions.  NEUROLOGIC: The patient was alert and oriented to person, place, time, and circumstance. Speech was normal. Cranial nerves were normal. Motor strength was normal for age. The patient was normally coordinated.  Sensation intact.  PSYCHIATRIC:  Mood and affect were normal and the patient had normal recent and remote memory. The patient's judgment and insight were normal.           Medication List          Accurate as of January 2, 2020  3:34 PM. If you have any questions, ask your nurse or doctor.            STOP taking these medications    codeine-guaiFENesin  mg/5 mL liquid  Also known as:  GUAIFENESIN AC  Stopped by:  Earnest Hein MD            Additional Screenings Completed Today:

## 2021-08-20 ENCOUNTER — TRANSFERRED RECORDS (OUTPATIENT)
Dept: HEALTH INFORMATION MANAGEMENT | Facility: CLINIC | Age: 41
End: 2021-08-20

## 2021-08-26 ENCOUNTER — IMMUNIZATION (OUTPATIENT)
Dept: NURSING | Facility: CLINIC | Age: 41
End: 2021-08-26
Payer: COMMERCIAL

## 2021-08-26 PROCEDURE — 91300 PR COVID VAC PFIZER DIL RECON 30 MCG/0.3 ML IM: CPT

## 2021-08-26 PROCEDURE — 0001A PR COVID VAC PFIZER DIL RECON 30 MCG/0.3 ML IM: CPT

## 2021-09-01 ENCOUNTER — TRANSFERRED RECORDS (OUTPATIENT)
Dept: HEALTH INFORMATION MANAGEMENT | Facility: CLINIC | Age: 41
End: 2021-09-01

## 2021-09-16 ENCOUNTER — IMMUNIZATION (OUTPATIENT)
Dept: NURSING | Facility: CLINIC | Age: 41
End: 2021-09-16
Attending: PEDIATRICS
Payer: COMMERCIAL

## 2021-09-16 PROCEDURE — 0002A PR COVID VAC PFIZER DIL RECON 30 MCG/0.3 ML IM: CPT

## 2021-09-16 PROCEDURE — 91300 PR COVID VAC PFIZER DIL RECON 30 MCG/0.3 ML IM: CPT

## 2021-10-17 ENCOUNTER — HEALTH MAINTENANCE LETTER (OUTPATIENT)
Age: 41
End: 2021-10-17

## 2021-10-25 ENCOUNTER — TRANSFERRED RECORDS (OUTPATIENT)
Dept: HEALTH INFORMATION MANAGEMENT | Facility: CLINIC | Age: 41
End: 2021-10-25
Payer: COMMERCIAL

## 2021-12-06 ENCOUNTER — ALLIED HEALTH/NURSE VISIT (OUTPATIENT)
Dept: FAMILY MEDICINE | Facility: CLINIC | Age: 41
End: 2021-12-06
Payer: COMMERCIAL

## 2021-12-06 DIAGNOSIS — Z23 NEED FOR INFLUENZA VACCINATION: Primary | ICD-10-CM

## 2021-12-06 DIAGNOSIS — R29.818 SUSPECTED SLEEP APNEA: Primary | ICD-10-CM

## 2021-12-06 PROCEDURE — 90686 IIV4 VACC NO PRSV 0.5 ML IM: CPT

## 2021-12-06 PROCEDURE — 99207 PR NO CHARGE NURSE ONLY: CPT

## 2021-12-06 PROCEDURE — 90471 IMMUNIZATION ADMIN: CPT

## 2022-01-07 ENCOUNTER — TRANSFERRED RECORDS (OUTPATIENT)
Dept: HEALTH INFORMATION MANAGEMENT | Facility: CLINIC | Age: 42
End: 2022-01-07

## 2022-01-19 ENCOUNTER — TRANSFERRED RECORDS (OUTPATIENT)
Dept: HEALTH INFORMATION MANAGEMENT | Facility: CLINIC | Age: 42
End: 2022-01-19

## 2022-04-02 ENCOUNTER — HEALTH MAINTENANCE LETTER (OUTPATIENT)
Age: 42
End: 2022-04-02

## 2022-04-15 ENCOUNTER — OFFICE VISIT (OUTPATIENT)
Dept: INTERNAL MEDICINE | Facility: CLINIC | Age: 42
End: 2022-04-15
Payer: COMMERCIAL

## 2022-04-15 VITALS
DIASTOLIC BLOOD PRESSURE: 96 MMHG | WEIGHT: 272.8 LBS | BODY MASS INDEX: 36.15 KG/M2 | HEART RATE: 79 BPM | HEIGHT: 73 IN | OXYGEN SATURATION: 96 % | SYSTOLIC BLOOD PRESSURE: 136 MMHG

## 2022-04-15 DIAGNOSIS — Z00.00 ROUTINE GENERAL MEDICAL EXAMINATION AT A HEALTH CARE FACILITY: Primary | ICD-10-CM

## 2022-04-15 DIAGNOSIS — E66.09 CLASS 2 OBESITY DUE TO EXCESS CALORIES WITHOUT SERIOUS COMORBIDITY WITH BODY MASS INDEX (BMI) OF 36.0 TO 36.9 IN ADULT: ICD-10-CM

## 2022-04-15 DIAGNOSIS — R03.0 BLOOD PRESSURE ELEVATED WITHOUT HISTORY OF HTN: ICD-10-CM

## 2022-04-15 DIAGNOSIS — G89.29 CHRONIC PAIN OF BOTH KNEES: ICD-10-CM

## 2022-04-15 DIAGNOSIS — R29.818 SUSPECTED SLEEP APNEA: ICD-10-CM

## 2022-04-15 DIAGNOSIS — E66.812 CLASS 2 OBESITY DUE TO EXCESS CALORIES WITHOUT SERIOUS COMORBIDITY WITH BODY MASS INDEX (BMI) OF 36.0 TO 36.9 IN ADULT: ICD-10-CM

## 2022-04-15 DIAGNOSIS — M25.562 CHRONIC PAIN OF BOTH KNEES: ICD-10-CM

## 2022-04-15 DIAGNOSIS — M25.561 CHRONIC PAIN OF BOTH KNEES: ICD-10-CM

## 2022-04-15 DIAGNOSIS — R06.83 SNORING: ICD-10-CM

## 2022-04-15 PROBLEM — E66.811 OBESITY (BMI 30.0-34.9): Status: RESOLVED | Noted: 2017-12-01 | Resolved: 2022-04-15

## 2022-04-15 LAB
ALBUMIN SERPL-MCNC: 4.4 G/DL (ref 3.5–5)
ALP SERPL-CCNC: 44 U/L (ref 45–120)
ALT SERPL W P-5'-P-CCNC: 32 U/L (ref 0–45)
ANION GAP SERPL CALCULATED.3IONS-SCNC: 9 MMOL/L (ref 5–18)
AST SERPL W P-5'-P-CCNC: 18 U/L (ref 0–40)
BILIRUB SERPL-MCNC: 0.5 MG/DL (ref 0–1)
BUN SERPL-MCNC: 10 MG/DL (ref 8–22)
CALCIUM SERPL-MCNC: 9.7 MG/DL (ref 8.5–10.5)
CHLORIDE BLD-SCNC: 105 MMOL/L (ref 98–107)
CHOLEST SERPL-MCNC: 173 MG/DL
CO2 SERPL-SCNC: 25 MMOL/L (ref 22–31)
CREAT SERPL-MCNC: 0.8 MG/DL (ref 0.7–1.3)
ERYTHROCYTE [DISTWIDTH] IN BLOOD BY AUTOMATED COUNT: 12.3 % (ref 10–15)
FASTING STATUS PATIENT QL REPORTED: ABNORMAL
GFR SERPL CREATININE-BSD FRML MDRD: >90 ML/MIN/1.73M2
GLUCOSE BLD-MCNC: 97 MG/DL (ref 70–125)
HCT VFR BLD AUTO: 39.5 % (ref 40–53)
HDLC SERPL-MCNC: 39 MG/DL
HGB BLD-MCNC: 13.8 G/DL (ref 13.3–17.7)
LDLC SERPL CALC-MCNC: 116 MG/DL
MCH RBC QN AUTO: 30.7 PG (ref 26.5–33)
MCHC RBC AUTO-ENTMCNC: 34.9 G/DL (ref 31.5–36.5)
MCV RBC AUTO: 88 FL (ref 78–100)
PLATELET # BLD AUTO: 283 10E3/UL (ref 150–450)
POTASSIUM BLD-SCNC: 3.8 MMOL/L (ref 3.5–5)
PROT SERPL-MCNC: 7.1 G/DL (ref 6–8)
RBC # BLD AUTO: 4.5 10E6/UL (ref 4.4–5.9)
SODIUM SERPL-SCNC: 139 MMOL/L (ref 136–145)
TRIGL SERPL-MCNC: 92 MG/DL
WBC # BLD AUTO: 9.3 10E3/UL (ref 4–11)

## 2022-04-15 PROCEDURE — 80053 COMPREHEN METABOLIC PANEL: CPT | Performed by: INTERNAL MEDICINE

## 2022-04-15 PROCEDURE — 36415 COLL VENOUS BLD VENIPUNCTURE: CPT | Performed by: INTERNAL MEDICINE

## 2022-04-15 PROCEDURE — 99213 OFFICE O/P EST LOW 20 MIN: CPT | Mod: 25 | Performed by: INTERNAL MEDICINE

## 2022-04-15 PROCEDURE — 99396 PREV VISIT EST AGE 40-64: CPT | Performed by: INTERNAL MEDICINE

## 2022-04-15 PROCEDURE — 85027 COMPLETE CBC AUTOMATED: CPT | Performed by: INTERNAL MEDICINE

## 2022-04-15 PROCEDURE — 80061 LIPID PANEL: CPT | Performed by: INTERNAL MEDICINE

## 2022-04-15 NOTE — PATIENT INSTRUCTIONS
Preventive Health Recommendations  Male Ages 40 to 49    Yearly exam:             See your health care provider every year in order to  o   Review health changes.   o   Discuss preventive care.    o   Review your medicines if your doctor has prescribed any.  You should be tested each year for STDs (sexually transmitted diseases) if you re at risk.   Cholesterol screening  Colonoscopy at age 45  Diabetes screening  Talk with your health care provider about whether or not a prostate cancer screening test (PSA) is right for you.    Shots: Get a flu shot each year in the fall. Get a tetanus shot every 10 years.  I would recommend getting a COVID booster this spring    Nutrition:  Eat at least 5 servings of fruits and vegetables daily.   Eat whole-grain bread, whole-wheat pasta and brown rice instead of white grains and rice.   Get adequate Calcium and Vitamin D.     Lifestyle  Exercise for at least 150 minutes a week (30 minutes a day, 5 days a week). This will help you control your weight and prevent disease.  Set a goal of losing 10 to 15 pounds over the next 6 to 12 months.  Limit alcohol to one drink per day.   No smoking.   Wear sunscreen to prevent skin cancer.   See your dentist every six months for an exam and cleaning.

## 2022-04-15 NOTE — PROGRESS NOTES
SUBJECTIVE:   CC: Peewee Walsh is an 41 year old male who presents for preventative health visit.     HPI-in addition to his annual preventive visit, Dean is here to discuss several other concerns including chronic snoring and possible sleep apnea, weight gain with obesity.,  And chronic pain involving both knees.  See assessment and plan for details.    Patient has been advised of split billing requirements and indicates understanding: Yes  Healthy Habits:     Getting at least 3 servings of Calcium per day:  NO    Bi-annual eye exam:  Yes    Dental care twice a year:  NO    Sleep apnea or symptoms of sleep apnea:  None    Diet:  Regular (no restrictions)    Frequency of exercise:  None    Taking medications regularly:  Not Applicable    PHQ-2 Total Score: 0    Additional concerns today:  No              Today's PHQ-2 Score:   PHQ-2 ( 1999 Pf[employer]) 4/15/2022   Q1: Little interest or pleasure in doing things 0   Q2: Feeling down, depressed or hopeless 0   PHQ-2 Score 0   Q1: Little interest or pleasure in doing things Not at all   Q2: Feeling down, depressed or hopeless Not at all   PHQ-2 Score 0       Abuse: Current or Past(Physical, Sexual or Emotional)- No  Do you feel safe in your environment? Yes        Social History     Tobacco Use     Smoking status: Never Smoker     Smokeless tobacco: Never Used   Substance Use Topics     Alcohol use: Yes     Comment: Alcoholic Drinks/day: 1-2/ month     If you drink alcohol do you typically have >3 drinks per day or >7 drinks per week? No    Alcohol Use 4/15/2022   Prescreen: >3 drinks/day or >7 drinks/week? Not Applicable   Prescreen: >3 drinks/day or >7 drinks/week? -       Last PSA: No results found for: PSA    Reviewed orders with patient. Reviewed health maintenance and updated orders accordingly - Yes  Lab work is in process    Reviewed and updated as needed this visit by clinical staff   Tobacco  Allergies  Meds  Problems  Med Hx  Surg Hx  Fam Hx         "    Reviewed and updated as needed this visit by Provider   Tobacco  Allergies  Meds  Problems  Med Hx  Surg Hx  Fam Hx           Past Medical History:   Diagnosis Date     Acute cervical radiculopathy 11/23/2015     Anemia      Chronic elbow pain, right 1/2/2020     Chronic headaches 12/1/2017     Chronic pain of both knees 4/15/2022     Chronic pain of right knee 1/7/2021     Chronic right shoulder pain 11/23/2015     Class 2 obesity in adult 4/15/2022     Community acquired pneumonia     2018     Elevated blood pressure      Hand pain, right 10/3/2017    Tendinitis versus carpal tunnel     Left ankle injury 2010    High-grade tear anterior talofibular ligament     Left foot pain 11/23/2015     Obesity (BMI 30.0-34.9) 12/1/2017     Plantar fasciitis, bilateral 11/23/2015     Shoulder separation      Snoring 12/1/2017     Suspected sleep apnea 4/15/2022      Past Surgical History:   Procedure Laterality Date     WISDOM TOOTH EXTRACTION         Review of Systems  12 point review of systems is negative other than what is discussed in the assessment and plan    OBJECTIVE:   BP (!) 136/96   Pulse 79   Ht 1.842 m (6' 0.5\")   Wt 123.7 kg (272 lb 12.8 oz)   SpO2 96%   BMI 36.49 kg/m      Physical Exam  EYES: Eyelids, conjunctiva, and sclera were normal. Pupils were normal. Cornea, iris, and lens were normal bilaterally.  HEAD, EARS, NOSE, MOUTH, AND THROAT: Head and face were normal. TMs and external auditory canals are normal  NECK: Neck appearance was normal. There were no neck masses and the thyroid was not enlarged and no nodules are felt.  No lymphadenopathy.  RESPIRATORY: Breathing pattern was normal and the chest moved symmetrically.   Lung sounds were normal and there were no rales or wheezes.  CARDIOVASCULAR: Heart rate and rhythm were normal.  S1 and S2 were normal and there were no extra sounds or murmurs. Peripheral pulses in arms and legs were normal.  Jugular venous pressure was normal.  There " was no peripheral edema.  No carotid bruits.  GASTROINTESTINAL:  Bowel sounds were present.   Palpation detected no tenderness, mass, or enlarged organs.   MUSCULOSKELETAL: Skeletal configuration was normal and muscle mass was normal for age. Joint appearance was overall normal.  No reproducible tenderness in either knee.  No effusion.  No warmth or redness  LYMPHATIC: There were no enlarged nodes.  SKIN/HAIR/NAILS: Skin color was normal.  Hair and nails were normal.There were no skin lesions.  NEUROLOGIC: The patient was alert and oriented to person, place, time, and circumstance. Speech was normal. Cranial nerves were normal. Motor strength was normal for age. The patient was normally coordinated.  Sensation intact.  PSYCHIATRIC:  Mood and affect were normal and the patient had normal recent and remote memory. The patient's judgment and insight were normal.        ASSESSMENT/PLAN:   1. Routine general medical examination at a health care facility  Immunizations are reviewed and he declines having a COVID booster at this time.  Recommending annual flu shot.  Living will discussed.  Information provided.  Non-smoker.  Uses alcohol in moderation.  Regular exercise discussed.  Colonoscopy at age 46.  PSA annually beginning at age 51.  No symptoms to warrant prostate exam at this time.   Recommending that he sees his ophthalmologist every year. Skin exam performed and recommending regular use of sunblock.  Hepatitis C antibody for screening was normal.  Will screen for diabetes with fasting glucose.  Checking fasting lipid profile.    - CBC with platelets; Future  - Comprehensive metabolic panel (BMP + Alb, Alk Phos, ALT, AST, Total. Bili, TP); Future  - Lipid Profile (Chol, Trig, HDL, LDL calc); Future  - CBC with platelets  - Comprehensive metabolic panel (BMP + Alb, Alk Phos, ALT, AST, Total. Bili, TP)  - Lipid Profile (Chol, Trig, HDL, LDL calc)    2. Class 2 obesity due to excess calories without serious  "comorbidity with body mass index (BMI) of 36.0 to 36.9 in adult  He has unfortunately gained 17 pounds over the last year.  His job is more sedentary than last.  He needs to find other ways to get exercise.  Unfortunately his knees have been causing him trouble interfering with these options.  He also needs to better control his diet.  Struggles with this.  Undoubtedly contributing to blood pressure elevation found today and certainly not helping his chronic knee pain.    3. Blood pressure elevated without history of HTN  Blood pressure is not at goal.  We discussed the importance of more regular exercise, sodium restriction and weight loss.  He will return in 6 months to have his blood pressure rechecked.  There is strong family history of hypertension.  Consider blood pressure medication if not significantly improved.    4. Suspected sleep apnea with snoring  Worsening snoring with suspicion for possible sleep apnea.  I am recommending evaluation at sleep clinic.  - Adult Sleep Eval & Management  Referral; Future        6. Chronic pain of both knees  Evaluated by orthopedics for worsening bilateral knee pain.  X-rays have been obtained.  He has had some injections with minimal benefit.  He may benefit from physical therapy and can certainly benefit from weight loss.    Patient has been advised of split billing requirements and indicates understanding: Yes    COUNSELING:   Reviewed preventive health counseling, as reflected in patient instructions       Regular exercise       Healthy diet/nutrition       Vision screening       Advance Care Planning    Estimated body mass index is 36.49 kg/m  as calculated from the following:    Height as of this encounter: 1.842 m (6' 0.5\").    Weight as of this encounter: 123.7 kg (272 lb 12.8 oz).         He reports that he has never smoked. He has never used smokeless tobacco.      Counseling Resources:  ATP IV Guidelines  Pooled Cohorts Equation Calculator  FRAX Risk " Assessment  ICSI Preventive Guidelines  Dietary Guidelines for Americans, 2010  USDA's MyPlate  ASA Prophylaxis  Lung CA Screening    Earnest Hein MD  [facility]

## 2022-05-15 ENCOUNTER — OFFICE VISIT (OUTPATIENT)
Dept: FAMILY MEDICINE | Facility: CLINIC | Age: 42
End: 2022-05-15
Payer: COMMERCIAL

## 2022-05-15 VITALS
DIASTOLIC BLOOD PRESSURE: 81 MMHG | HEART RATE: 82 BPM | SYSTOLIC BLOOD PRESSURE: 136 MMHG | OXYGEN SATURATION: 97 % | RESPIRATION RATE: 15 BRPM | WEIGHT: 270 LBS | BODY MASS INDEX: 36.12 KG/M2 | TEMPERATURE: 98.4 F

## 2022-05-15 DIAGNOSIS — H00.011 HORDEOLUM EXTERNUM OF RIGHT UPPER EYELID: Primary | ICD-10-CM

## 2022-05-15 PROCEDURE — 99203 OFFICE O/P NEW LOW 30 MIN: CPT | Performed by: PHYSICIAN ASSISTANT

## 2022-05-15 RX ORDER — POLYMYXIN B SULFATE AND TRIMETHOPRIM 1; 10000 MG/ML; [USP'U]/ML
1-2 SOLUTION OPHTHALMIC EVERY 4 HOURS
Qty: 3 ML | Refills: 0 | Status: SHIPPED | OUTPATIENT
Start: 2022-05-15 | End: 2022-05-20

## 2022-05-15 NOTE — PROGRESS NOTES
Chief Complaint   Patient presents with     Eye Problem     Right eye swelling x started yesterday       ASSESSMENT/PLAN:  Peewee was seen today for eye problem.    Diagnoses and all orders for this visit:    Hordeolum externum of right upper eyelid  -     trimethoprim-polymyxin b (POLYTRIM) 84273-3.1 UNIT/ML-% ophthalmic solution; Place 1-2 drops into the right eye every 4 hours for 5 days    Stye of right upper eyelid with some surrounding erythema and swelling.  Since straining will provide some prophylactic antibiotic drops to prevent spreading infection.  Continue warm compresses.    Peewee Sarabia PA-C      SUBJECTIVE:  Peewee is a 41 year old male who presents to urgent care with swelling of the right upper eyelid since yesterday.  He was able to pick a piece of the skin off from the underside of his eyelid and it began to drain.  He will have some blurry vision while straining but otherwise no vision changes and no pain with eye movements or photophobia.  Does normally wear contacts.  Right upper eyelid is tender and swollen    ROS: Pertinent ROS neg other than the symptoms noted above in the HPI.     OBJECTIVE:  /81   Pulse 82   Temp 98.4  F (36.9  C)   Resp 15   Wt 122.5 kg (270 lb)   SpO2 97%   BMI 36.12 kg/m     GENERAL: healthy, alert and no distress  EYES: Right upper eyelid swollen, mild conjunctival injection, PERRLA, extraocular movements intact, no photophobia, right upper eyelid eversion shows a minimally oozing punctum consistent with stye    DIAGNOSTICS    No results found for any visits on 05/15/22.     No current outpatient medications on file.     No current facility-administered medications for this visit.      Patient Active Problem List   Diagnosis     Chronic right shoulder pain     Chronic headaches     Snoring     Anemia     Chronic fatigue     Chronic pain of right knee     Suspected sleep apnea     Chronic pain of both knees     Class 2 obesity in adult     Blood  pressure elevated without history of HTN      Past Medical History:   Diagnosis Date     Acute cervical radiculopathy 11/23/2015     Anemia      Blood pressure elevated without history of HTN 4/15/2022     Chronic elbow pain, right 1/2/2020     Chronic headaches 12/1/2017     Chronic pain of both knees 4/15/2022     Chronic pain of right knee 1/7/2021     Chronic right shoulder pain 11/23/2015     Class 2 obesity in adult 4/15/2022     Community acquired pneumonia     2018     Elevated blood pressure      Hand pain, right 10/3/2017    Tendinitis versus carpal tunnel     Left ankle injury 2010    High-grade tear anterior talofibular ligament     Left foot pain 11/23/2015     Obesity (BMI 30.0-34.9) 12/1/2017     Plantar fasciitis, bilateral 11/23/2015     Shoulder separation      Snoring 12/1/2017     Suspected sleep apnea 4/15/2022     Past Surgical History:   Procedure Laterality Date     WISDOM TOOTH EXTRACTION       Family History   Problem Relation Age of Onset     Hypertension Mother      Hypertension Father      Heart Failure Maternal Grandfather      Coronary Artery Disease Paternal Grandmother         early 60s     Social History     Tobacco Use     Smoking status: Never Smoker     Smokeless tobacco: Never Used   Substance Use Topics     Alcohol use: Yes     Comment: Alcoholic Drinks/day: 1-2/ month              The plan of care was discussed with the patient. They understand and agree with the course of treatment prescribed. A printed summary was given including instructions and medications.  The use of [employer] dictation services may have been used to construct the content in this note; any grammatical or spelling errors are non-intentional. Please contact the author of this note directly if you are in need of any clarification.

## 2022-08-29 ASSESSMENT — SLEEP AND FATIGUE QUESTIONNAIRES

## 2022-08-30 PROBLEM — E66.812 CLASS 2 OBESITY IN ADULT: Chronic | Status: ACTIVE | Noted: 2022-04-15

## 2022-08-31 ENCOUNTER — VIRTUAL VISIT (OUTPATIENT)
Dept: SLEEP MEDICINE | Facility: CLINIC | Age: 42
End: 2022-08-31
Attending: INTERNAL MEDICINE
Payer: COMMERCIAL

## 2022-08-31 VITALS — HEIGHT: 73 IN | WEIGHT: 260 LBS | BODY MASS INDEX: 34.46 KG/M2

## 2022-08-31 DIAGNOSIS — R53.81 MALAISE AND FATIGUE: ICD-10-CM

## 2022-08-31 DIAGNOSIS — E66.09 CLASS 1 OBESITY DUE TO EXCESS CALORIES WITH BODY MASS INDEX (BMI) OF 34.0 TO 34.9 IN ADULT, UNSPECIFIED WHETHER SERIOUS COMORBIDITY PRESENT: ICD-10-CM

## 2022-08-31 DIAGNOSIS — E66.811 CLASS 1 OBESITY DUE TO EXCESS CALORIES WITH BODY MASS INDEX (BMI) OF 34.0 TO 34.9 IN ADULT, UNSPECIFIED WHETHER SERIOUS COMORBIDITY PRESENT: ICD-10-CM

## 2022-08-31 DIAGNOSIS — G47.30 SLEEP APNEA, UNSPECIFIED TYPE: ICD-10-CM

## 2022-08-31 DIAGNOSIS — R06.00 DYSPNEA AND RESPIRATORY ABNORMALITY: Primary | ICD-10-CM

## 2022-08-31 DIAGNOSIS — R53.83 MALAISE AND FATIGUE: ICD-10-CM

## 2022-08-31 DIAGNOSIS — R29.818 SUSPECTED SLEEP APNEA: ICD-10-CM

## 2022-08-31 DIAGNOSIS — Z72.820 LACK OF ADEQUATE SLEEP: ICD-10-CM

## 2022-08-31 DIAGNOSIS — R06.89 DYSPNEA AND RESPIRATORY ABNORMALITY: Primary | ICD-10-CM

## 2022-08-31 PROCEDURE — 99204 OFFICE O/P NEW MOD 45 MIN: CPT | Mod: 95 | Performed by: PHYSICIAN ASSISTANT

## 2022-08-31 NOTE — PROGRESS NOTES
Outpatient Sleep Medicine Consultation:      Name: Peewee Walsh MRN# [id number]   Age: 42 year old YOB: 1980     Date of Consultation: August 31, 2022  Consultation is requested by: Earnest Hein MD  [address] [place] [address] Earnest Hein  Primary care provider: Earnest Hein       Reason for Sleep Consult:     Peewee Walsh is sent by Earnest Hein for a sleep consultation regarding snoring, suspected sleep apnea.    Patient s Reason for visit  Peewee Walsh main reason for visit: I snore, and my wife is convinced i have sleep apnea.  Patient states problem(s) started: Years ago.  Peewee Walsh's goals for this visit: Determine if i have sleep apnea.           Assessment and Plan:     Summary Sleep Diagnoses:  Patient has features and risk factors for possible obstructive sleep apnea including: obesity, loud snoring, witnessed apnea, difficulty maintaining sleep and family history of sleep apnea. The STOP-BANG score is 4/8. The pathophysiology, diagnosis and treatment of YFN was discussed.  Plan:    1. Schedule a Home Sleep Apnea Testing to evaluate for obstructive sleep apnea.    2. Recommend weight management. We discussed the link between obesity, sleep apnea, and health outcomes.        Summary Recommendations:    Orders Placed This Encounter   Procedures     HST-Home Sleep Apnea Test - Noxturnal Returnable       Summary Counseling:    Sleep Testing Reviewed  Obstructive Sleep Apnea Reviewed  Complications of Untreated Sleep Apnea Reviewed      Medical Decision-making:   Educational materials provided in instructions    Total time spent reviewing medical records, history and physical examination, review of previous testing and interpretation as well as documentation on this date:45 minutes    CC: Earnest Hein          History of Present Illness:     Past Sleep Evaluations:    SLEEP-WAKE SCHEDULE:     Work/School Days: Patient goes  to school/work: Yes   Usually gets into bed at [id number]  Takes patient about 5-30 minutes. to fall asleep  Has trouble falling asleep 0 nights per week  Wakes up in the middle of the night 1-4 times.  Wakes up due to Use the bathroom;Uncertain  He has trouble falling back asleep Approximately 1 time every 2-3 weeks. times a week.   It usually takes 30 minutes to 3 hours. to get back to sleep  Patient is usually up at 4 am.  Uses alarm: Yes    Weekends/Non-work Days/All Other Days:  Usually gets into bed at [id number]   Takes patient about 5-30 minutes. to fall asleep  Patient is usually up at [id number]  Uses alarm: No    Sleep Need  Patient gets  8 hours. sleep on average.    Patient thinks he needs about The same. sleep    Peewee Walsh prefers to sleep in this position(s): Side   Patient states they do the following activities in bed: Read;Use phone, computer, or tablet    Naps  Patient takes a purposeful nap Never times a week and naps are usually N/a in duration  He feels better after a nap: Yes  He dozes off unintentionally 1-2/month. days per week  Patient has had a driving accident or near-miss due to sleepiness/drowsiness: No      SLEEP DISRUPTIONS:    Breathing/Snoring  Patient snores:Yes  Other people complain about his snoring: Yes  Patient has been told he stops breathing in his sleep: Yes per wife  He has issues with the following: Morning mouth dryness;Stuffy nose when you wake up;Getting up to urinate more than once    Movement:  Patient gets pain, discomfort, with an urge to move:  Yes. No restless leg syndrome  It happens when he is resting:  Yes  It happens more at night:  No  Patient has been told he kicks his legs at night:  No     Behaviors in Sleep:  Peewee Walsh has experienced the following behaviors while sleeping:    He has experienced sudden muscle weakness during the day: No    Is there anything else you would like your sleep provider to know: No.      CAFFEINE AND OTHER  SUBSTANCES:    Patient consumes caffeinated beverages per day:  25-30 ounces of coffee daily, done by 1100.  Weekends can increase to 50 ounces of coffee done by 1300.  Occasinal a carbonated caffeinated soda beverage.  Last caffeine use is usually: Generally done by 1100, almost always by 1400, rarely done by 1800.  List of any prescribed or over the counter stimulants that patient takes: None.  List of any prescribed or over the counter sleep medication patient takes: None.  List of previous sleep medications that patient has tried:    Patient drinks alcohol to help them sleep: No  Patient drinks alcohol near bedtime: No    Family History:  Patient has a family member been diagnosed with a sleep disorder: Dad (YFN)        SCALES:    EPWORTH SLEEPINESS SCALE      Holton Sleepiness Scale ( LAWSON Burden  1990-1997[facility]/English - Final version - 21 Nov 07 - [facility].) 8/29/2022   Sitting and reading Would never doze   Watching TV Would never doze   Sitting, inactive in a public place (e.g. a theatre or a meeting) Would never doze   As a passenger in a car for an hour without a break Would never doze   Lying down to rest in the afternoon when circumstances permit Would never doze   Sitting and talking to someone Would never doze   Sitting quietly after a lunch without alcohol Would never doze   In a car, while stopped for a few minutes in traffic Would never doze   Holton Score (MC) 0   Holton Score (Sleep) 0         INSOMNIA SEVERITY INDEX (SANDRA)      Insomnia Severity Index (ASNDRA) 8/29/2022   Difficulty falling asleep 0   Difficulty staying asleep 1   Problems waking up too early 1   How SATISFIED/DISSATISFIED are you with your CURRENT sleep pattern? 1   How NOTICEABLE to others do you think your sleep problem is in terms of impairing the quality of your life? 0   How WORRIED/DISTRESSED are you about your current sleep problem? 0   To what extent do you consider your sleep problem to INTERFERE with  "your daily functioning (e.g. daytime fatigue, mood, ability to function at work/daily chores, concentration, memory, mood, etc.) CURRENTLY? 0   SANDRA Total Score 3       Guidelines for Scoring/Interpretation:  Total score categories:  0-7 = No clinically significant insomnia   8-14 = Subthreshold insomnia   15-21 = Clinical insomnia (moderate severity)  22-28 = Clinical insomnia (severe)  Used via courtesy of www.[employer] [facility].va.gov with permission from Earnest Alfaro PhD., [facility]      STOP BANG     STOP BANG Questionnaire (  2008, the American Society of Anesthesiologists, Inc. [employer] 8/31/2022   1. Snoring - Do you snore loudly (louder than talking or loud enough to be heard through closed doors)? -   2. Tired - Do you often feel tired, fatigued, or sleepy during daytime? -   3. Observed - Has anyone observed you stop breathing during your sleep? -   4. Blood pressure - Do you have or are you being treated for high blood pressure? -   5. BMI - BMI more than 35 kg/m2? -   6. Age - Age over 50 yr old? -   7. Neck circumference - Neck circumference greater than 40 cm? -   8. Gender - Gender male? -   STOP BANG Score (MC): -   B/P Clinic: -   BMI Clinic: 34.3         GAD7    No flowsheet data found.      CAGE-AID    No flowsheet data found.    CAGE-AID reprinted with permission from the [place] [employer] Journal, JAMES Torres. and KATHY Saravia, \"Conjoint screening questionnaires for alcohol and drug abuse\" [place] Medical Journal 94: 135-140, 1995.      PATIENT HEALTH QUESTIONNAIRE-9 (PHQ - 9)    PHQ-9 ([employer]) 1/2/2020   1.  Little interest or pleasure in doing things 0   2.  Feeling down, depressed, or hopeless 0       Developed by Trent Rawls, Arianne Wolfe, Malvin Medina and colleagues, with an educational julita from [employer] No permission required to reproduce, translate, display or distribute.        Allergies:    No Known Allergies    Medications:    No " current outpatient medications on file.       Problem List:  Patient Active Problem List    Diagnosis Date Noted     Suspected sleep apnea 04/15/2022     Priority: Medium     Chronic pain of both knees 04/15/2022     Priority: Medium     Class 2 obesity in adult 04/15/2022     Priority: Medium     Blood pressure elevated without history of HTN 04/15/2022     Priority: Medium     Chronic fatigue 12/13/2018     Priority: Medium     Chronic headaches 12/01/2017     Priority: Medium     Snoring 12/01/2017     Priority: Medium     Chronic right shoulder pain 11/23/2015     Priority: Medium        Past Medical/Surgical History:  Past Medical History:   Diagnosis Date     Acute cervical radiculopathy 11/23/2015     Anemia      Blood pressure elevated without history of HTN 4/15/2022     Chronic elbow pain, right 1/2/2020     Chronic headaches 12/1/2017     Chronic pain of both knees 4/15/2022     Chronic pain of right knee 1/7/2021     Chronic right shoulder pain 11/23/2015     Class 2 obesity in adult 4/15/2022     Community acquired pneumonia     2018     Elevated blood pressure      Hand pain, right 10/3/2017    Tendinitis versus carpal tunnel     Left ankle injury 2010    High-grade tear anterior talofibular ligament     Left foot pain 11/23/2015     Obesity (BMI 30.0-34.9) 12/1/2017     Plantar fasciitis, bilateral 11/23/2015     Shoulder separation      Snoring 12/1/2017     Suspected sleep apnea 4/15/2022     Past Surgical History:   Procedure Laterality Date     WISDOM TOOTH EXTRACTION         Social History:  Social History     Socioeconomic History     Marital status:      Spouse name: Not on file     Number of children: Not on file     Years of education: Not on file     Highest education level: Not on file   Occupational History     Comment: Declined. 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   Tobacco Use     Smoking status: Never Smoker     Smokeless tobacco: Never Used   Substance and Sexual Activity      "Alcohol use: Yes     Comment: Alcoholic Drinks/day: 1-2/ month     Drug use: Not on file     Sexual activity: Not on file   Other Topics Concern     Not on file   Social History Narrative    , Lisa  No children  [facility]     Social Determinants of Health     Financial Resource Strain: Not on file   Food Insecurity: Not on file   Transportation Needs: Not on file   Physical Activity: Not on file   Stress: Not on file   Social Connections: Not on file   Intimate Partner Violence: Not on file   Housing Stability: Not on file       Family History:  Family History   Problem Relation Age of Onset     Hypertension Mother      Hypertension Father      Heart Failure Maternal Grandfather      Coronary Artery Disease Paternal Grandmother         early 60s       Review of Systems:  A complete review of systems reviewed by me is negative with the exeption of what has been mentioned in the history of present illness.  In the last TWO WEEKS have you experienced any of the following symptoms?  Fevers: No  Night Sweats: No  Weight Gain: No  Pain at Night: Yes  Double Vision: No  Changes in Vision: No  Difficulty Breathing through Nose: No  Sore Throat in Morning: No  Dry Mouth in the Morning: Yes  Shortness of Breath Lying Flat: No  Shortness of Breath With Activity: No  Awakening with Shortness of Breath: No  Increased Cough: No  Heart Racing at Night: No  Swelling in Feet or Legs: No  Diarrhea at Night: No  Heartburn at Night: No  Urinating More than Once at Night: Yes  Losing Control of Urine at Night: No  Joint Pains at Night: Yes  Headaches in Morning: No  Weakness in Arms or Legs: No  Depressed Mood: No  Anxiety: No     Physical Examination:  Vitals: Ht 1.854 m (6' 1\")   Wt 117.9 kg (260 lb)   BMI 34.30 kg/m    BMI= Body mass index is 34.3 kg/m .                  Data: All pertinent previous laboratory data reviewed     Recent Labs   Lab Test 04/15/22  1714 01/07/21  1418    139   POTASSIUM 3.8 4.2   CHLORIDE 105 " "103   CO2 25 30   ANIONGAP 9 6   GLC 97 92   BUN 10 11   CR 0.80 0.79   MISAEL 9.7 9.4       Recent Labs   Lab Test 04/15/22  1714   WBC 9.3   RBC 4.50   HGB 13.8   HCT 39.5*   MCV 88   MCH 30.7   MCHC 34.9   RDW 12.3          Recent Labs   Lab Test 04/15/22  1714   PROTTOTAL 7.1   ALBUMIN 4.4   BILITOTAL 0.5   ALKPHOS 44*   AST 18   ALT 32       TSH (uIU/mL)   Date Value   12/13/2018 1.78       Ferritin   Date/Time Value Ref Range Status   12/13/2018 12:03  27 - 300 ng/mL Final       Jackie Aguilera PA-C 8/31/2022         Peewee Walsh is a 42 year old male being evaluated via a billable telephone visit.     \"This telephone visit will be conducted via a call between you and your physician/provider. We have found that certain health care needs can be provided without the need for an in-person visit or physical exam.  This service lets us provide the care you need with a telephone conversation.  If a prescription is necessary we can send it directly to your pharmacy.  If lab work is needed we can place an order for that and you can then stop by our lab to have the test done at a later time.\"    Telephone visits are billed at different rates depending on your insurance coverage.  Please reach out to your insurance provider with any questions.    Patient has given verbal consent for  a Telephone visit? Yes    What telephone number would you like your provider to contact at at:  [phone]    How would you like to obtain your AVS? MyChart      Telephone Visit Details:     Telephone Visit Start Time: 3:31 PM    Telephone Visit End Time: 3:54 PM      "

## 2022-08-31 NOTE — PATIENT INSTRUCTIONS
MY CONTACT NUMBERS ARE: [phone]  DOCTOR : PATTI Calix  SLEEP CENTER :   CPAP EQUIPMENT :    IF I HAVE SLEEP APNEA.....  WHERE CAN I FIND MORE INFORMATION?    American Academy of Sleep Medicine Patient information on sleep disorders:  http://yoursleep.aasmnet.org    THINGS TO REMEMBER  In most situations, sleep apnea is a lifelong disease that must be managed with daily therapy. Untreated disease, when severe, may result in an increased risk for an array of problems from heart disease to mood changes, car accidents and shorter lifespan.    CPAP -  WHY AND HOW?  Continuous positive airway pressure, or CPAP, is the most effective treatment for obstructive sleep apnea. A decision to use CPAP is a major step forward in the pursuit of a healthier life. The successful use of CPAP will help you breathe easier, sleep better and live healthier. Using CPAP can be a positive experience if you keep these chavarria points in mind:  Commitment  CPAP is not a quick fix for your problem. It involves a long-term commitment to improve your sleep and your health.    Communication  Stay in close communication with both your sleep doctor and your CPAP supplier. Ask lots of questions and seek help when you need it.    Consistency  Use CPAP all night, every night and for every nap. You will receive the maximum health benefits from CPAP when you use it every time that you sleep. This will also make it easier for your body to adjust to the treatment.    Correction  The first machine and mask that you try may not be the best ones for you. Work with your sleep doctor and your CPAP supplier to make corrections to your equipment selection. Ask about trying a different type of machine or mask if you have ongoing problems. Make sure that your mask is a good fit and learn to use your equipment properly.    Challenge  Tell a family member or close friend to ask you each morning if you used your CPAP the previous night. Have someone to  "challenge you to give it your best effort.    Connection   Your adjustment to CPAP will be easier if you are able to connect with others who use the same treatment. Ask your sleep doctor if there is a support group in your area for people who have sleep apnea, or look for one on the Internet.    Comfort   Increase your level of comfort by using a saline spray, decongestant or heated humidifier if CPAP irritates your nose, mouth or throat. Use your unit's \"ramp\" setting to slowly get used to the air pressure level. There may be soft pads you can buy that will fit over your mask straps. Look on www.CPAP.com for accessories such as these straps, a pillow contoured for side-sleeping with CPAP, longer hoses, hose covers to reduce condensation, or stands to keep the hose out of your way.                                 Cleaning   Clean your mask, tubing and headgear on a regular basis. Put this time in your schedule so that you don't forget to do it. Check and replace the filters for your CPAP unit and humidifier.    Completion   Although you are never finished with CPAP therapy, you should reward yourself by celebrating the completion of your first month of treatment. Expect this first month to be your hardest period of adjustment. It will involve some trial and error as you find the machine, mask and pressure settings that are right for you.    Continuation  After your first month of treatment, continue to make a daily commitment to use your CPAP all night, every night and for every nap.    CPAP-Tips to starting with success:  Begin using your CPAP for short periods of time during the day while you watch TV or read.    Use CPAP every night and for every nap. Using it less often reduces the health benefits and makes it harder for your body to get used to it.    Newer CPAP models are virtually silent; however, if you find the sound of your CPAP machine to be bothersome, place the unit under your bed to dampen the sound. "     Make small adjustments to your mask, tubing, straps and headgear until you get the right fit. Tightening the mask may actually worsen the leak.  If it leaves significant marks on your face or irritates the bridge of your nose, it may not be the best mask for you.  Speak with the person who supplied the mask and consider trying other masks.    Use a saline nasal spray to ease mild nasal congestion. Neti-Pot or saline nasal rinses may also help. Nasal gel sprays can help reduce nasal dryness.  Biotene mouthwash can be helpful to protect your teeth if you experience frequent dry mouth.  Dry mouth may be a sign of air escaping out of your mouth or out of the mask in the case of a full face mask.  Speak with your provider if you expect that is the case.     Take a nasal decongestant to relieve more severe nasal or sinus congestion.  Do not use Afrin (oxymetazoline) nasal spray more than 3 days in a row.  Speak with your sleep doctor if your nasal congestion is chronic.    Use a heated humidifier that fits your CPAP model to enhance your breathing comfort. Adjust the heat setting up if you get a dry nose or throat, down if you get condensation in the hose or mask.  Position the CPAP lower than you so that any condensation in the hose drains back into the machine rather than towards the mask.    Try a system that uses nasal pillows if traditional masks give you problems.    Clean your mask, tubing and headgear once a week. Make sure the equipment dries fully.    Regularly check and replace the filters for your CPAP unit and humidifier.    Work closely with your sleep doctor and your CPAP supplier to make sure that you have the machine, mask and air pressure setting that works best for you.    BESIDES CPAP, WHAT OTHER THERAPIES ARE THERE?  Postioning devices if you only have the problem on your back  Dental devices if your condition is mild  Nasal valves may be effective though experience is limited  Tongue Retaining  Device if missing teeth precludes the use of a dental device  Weight loss if you are overweight  Surgery in limited cases where devices are not acceptable or there are problems with structures in the nose and throat  If treated with one of these alternative options, further evaluation is necessary to ensure that the therapy is effective. This may require some form of testing.     Healthy Lifestyle:  Healthy diet, exercise and Limit alcohol: Not only will excessive alcohol increase your weight over time, but it irritates the throat tissues and make them swell, shrinking the airway and causing snoring. Drinking alcohol should be limited and stopped within 3-4 hours before going to bed.   Stop smoking: (Red swollen throat, heat, nicotine), also irritates and swells the airway, among numerous other negative health consequences.    Positioning Device  This example shows a pillow that straps around the waist. It may be appropriate for those whose sleep study shows milder sleep apnea that occurs primarily when lying flat on one's back. Preliminary studies have shown benefit but effectiveness at home should be verified.    Nasal Valves              Nasal valves may not be effective if you have frequent nasal congestion or have difficulty breathing through your nose. They may be an option for mild apnea if other options are not well tolerated. The efficacy of these devices is generally less than CPAP or oral appliances.  Oral Appliance  These are examples of two of many custom-made devices that are more likely to work in mild sleep apnea  Oral appliances are dental mouth pieces that fit very much like a sports mouth guards or removable orthodontic retainers. They are used to treat snoring  And obstructive sleep apnea . The device prevents the airway from collapsing by either holding the tongue or supporting the jaw in a forward position. Since oral appliances are non-invasive and easy to use, they may be considered as an  early treatment option. Oral appliance therapy (OAT) involves the customization, selection, fabrication, fitting, adjustments and long-term follow-up care of specially designed oral devices, worn during sleep, which reposition the lower jaw and tongue base forward to maintain an open airway.  Custom made oral appliances are proven to be more effective than over-the-counter devices. Therefore, the over-the-counter devices are recommended not to be used as a screening tool nor as a therapeutic option.  Who gets a dental device?  Oral appliance therapy can be used as an alternative to  CPAP therapy for the treatment of mild to moderate sleep apnea and for those patients who prefer OAT to CPAP. Oral appliance therapy is a first line therapy for the treatment of primary snoring. Additionally, OAT is an option for those that cannot tolerate CPAP as therapy or who have experienced insufficient surgical results.    Possible side effects?  Frequent but minor side effects include: excessive salivation, dry mouth, discomfort of teeth and jaw and temporary changes in the patient s bite.  Potential complications include: jaw pain, permanent occlusal changes and TMJ symptoms.  The above mentioned side effects and complications can be recognized and managed by dentists trained in dental sleep medicine.    Finding a dentist that practices dental sleep medicine  Specific training is available through the American Academy of Dental Sleep Medicine for dentists interested in working in the field of sleep. To find a dentist who is educated in the field of sleep and the use of oral appliances, near you, visit the Web site of the American Academy of Dental Sleep Medicine; also see http://www.accpstorage.org/newOrganization/patients/oralAppliances.pdf   To search for a dentist certified in these practices:  Http://aadsm.org/FindADentist.aspx?1  Http://www.accpstorage.org/newOrganization/patients/oralAppliances.pdf    Tongue Retaining  Device               Tongue Retaining Devices are devices that generally  suction cup  onto the tongue preventing it from falling back into the back of the throat during sleep.  They may be an option for people missing teeth, but can be uncomfortable. This particular device can be purchased online, but similar devices made by dentists fit more precisely and may be tolerated better. In general, they are rarely effective and often not very well tolerated.    Weight Loss:  Some patients may experience reduction or elimination of sleep apnea with weight loss.  Though there are significant health benefits from weight loss, long-term weight loss is very difficult to achieve- studies show success with dietary management in less that 10% of people.     If you are interested in dietary weight loss, you should review the options discussed at the National Institutes of Health patient information site:     Http:/www.health.nih.gov/topic/WeightLossDieting    Bariatric programs offer counseling in all methods of weight loss:    Http:/www.[facility].org/Specialties/WeightLossSurgeryandMedicalMgmt/htm    Surgery:  There are a number of surgeries that have been attempted to treat apnea. In general, surgical options are usually reserved for cases in which there is a physical abnormality contributing to obstruction or other treatment options are ineffective or not tolerated. Most surgical options are either unreliable or quite invasive. One of the more common procedures is:  Uvulopalatopharyngoplasty: In this procedure, the uvula (the finger-like tissue that hangs in the back of the throat), part of the soft palate (the tissue that the uvula is attached to), and sometimes the tonsils or adenoids are removed. The efficacy of this surgery is around 30-50% .  After surgery, complications may include:  Sleepiness and sleep apnea related to post-surgery medication   Swelling, infection and bleeding   A sore throat and/or difficulty  "swallowing   Drainage of secretions into the nose and a nasal quality to the voice. English language speech does not seem to be affected by this surgery.   Narrowing of the airway in the nose and throat (hence constricting breathing) snoring and even iatrogenically caused sleep apnea. By cutting the tissues, excess scar tissue can \"tighten\" the airway and make it even smaller than it was before UPPP.  Patients who have had the uvula removed will become unable to correctly speak Faroese or any other language that has a uvular 'r' phoneme.    Surgeries to help resolve nasal congestion may help reduce the severity of apnea slightly. Nasal congestion does not cause apnea on its own, so these surgeries are usually not performed just for YFN.  They may be worth considering if the nasal congestion is significantly bothersome independent of apnea.   "

## 2022-10-01 ENCOUNTER — HEALTH MAINTENANCE LETTER (OUTPATIENT)
Age: 42
End: 2022-10-01

## 2022-10-28 ENCOUNTER — VIRTUAL VISIT (OUTPATIENT)
Dept: SLEEP MEDICINE | Facility: CLINIC | Age: 42
End: 2022-10-28
Attending: PHYSICIAN ASSISTANT
Payer: COMMERCIAL

## 2022-10-28 DIAGNOSIS — Z72.820 LACK OF ADEQUATE SLEEP: ICD-10-CM

## 2022-10-28 DIAGNOSIS — R06.00 DYSPNEA AND RESPIRATORY ABNORMALITY: ICD-10-CM

## 2022-10-28 DIAGNOSIS — R29.818 SUSPECTED SLEEP APNEA: ICD-10-CM

## 2022-10-28 DIAGNOSIS — R06.89 DYSPNEA AND RESPIRATORY ABNORMALITY: ICD-10-CM

## 2022-10-28 DIAGNOSIS — E66.09 CLASS 1 OBESITY DUE TO EXCESS CALORIES WITH BODY MASS INDEX (BMI) OF 34.0 TO 34.9 IN ADULT, UNSPECIFIED WHETHER SERIOUS COMORBIDITY PRESENT: ICD-10-CM

## 2022-10-28 DIAGNOSIS — R53.83 MALAISE AND FATIGUE: ICD-10-CM

## 2022-10-28 DIAGNOSIS — G47.30 SLEEP APNEA, UNSPECIFIED TYPE: ICD-10-CM

## 2022-10-28 DIAGNOSIS — R53.81 MALAISE AND FATIGUE: ICD-10-CM

## 2022-10-28 DIAGNOSIS — E66.811 CLASS 1 OBESITY DUE TO EXCESS CALORIES WITH BODY MASS INDEX (BMI) OF 34.0 TO 34.9 IN ADULT, UNSPECIFIED WHETHER SERIOUS COMORBIDITY PRESENT: ICD-10-CM

## 2022-10-28 NOTE — PROGRESS NOTES
watchpat home sleep test [facility]  Patient notified by [employer].

## 2022-11-14 PROCEDURE — 95800 SLP STDY UNATTENDED: CPT | Performed by: INTERNAL MEDICINE

## 2022-11-15 NOTE — PROGRESS NOTES
Watch Pat has been scored using rule 1B, 4%.  Patient to follow up with provider to determine appropriate therapy.    PAT AHI: 8    Ordering Provider: PATTI Rivero

## 2022-11-15 NOTE — PROCEDURES
"WatchPAT - HOME SLEEP STUDY INTERPRETATION    Patient: Peewee Walsh  MRN: [id number]  YOB: 1980  Study Date: 11/14/2022  Referring Provider: Earnest Hein;  Ordering Provider: Jackie Aguilera PA-C    Chain of custody patient verification was not enabled.       Indications for Home Study: Peewee Walsh is a 42 year old male with symptoms suggestive of obstructive sleep apnea.    Estimated body mass index is 34.3 kg/m  as calculated from the following:    Height as of 8/31/22: 1.854 m (6' 1\").    Weight as of 8/31/22: 117.9 kg (260 lb).  Total score - Alba: 0 (8/29/2022  5:33 PM)  StopBang Total Score: 4 (8/31/2022  4:00 PM)Total Score: 4 (8/29/2022  5:35 PM)    Data: A full night home sleep study was performed recording the standard physiologic parameters including peripheral arterial tonometry (PAT), sound/snoring, body position,  movement, sound, and oxygen saturation by pulse oximetry. Pulse rate was estimated by oximetry recording. Sleep staging (wake, REM, light, and deep sleep) was derived from PAT signal.  This study was considered adequate based on > 4 hours of quality oximetry and respiratory recording. As specified by the AASM Manual for the Scoring of Sleep and Associated events, version 2.3, Rule VIII.D 1B, 4% oxygen desaturation scoring for hypopneas is used as a standard of care on all home sleep apnea testing.    Total Recording Time: 8 hrs, 11 min  Total Sleep Time: 6 hrs, 57 min  % of Sleep Time REM: 23%    Respiratory:  Respiratory events: The PAT respiratory disturbance index [pRDI] was 15 events per hour.  The PAT apnea/hypopnea index [pAHI] was 8 events per hour.  KURTIS was 5 events per hour.  During REM sleep the pAHI was 19.  Sleep Associated Hypoxemia: sustained hypoxemia was not present. Mean oxygen saturation was 94%.  Minimum was 87%.  Time with saturation less than 88% was 0.3 minutes.    Heart Rate: By pulse oximetry normal rate was noted.     Position: " Percent of time spent: supine - 7%, prone - <1%, on right - 0%, on left - 93%.  pAHI was 35 per hour supine, n/a per hour prone, n/a per hour on right side, and 6 per hour on left side.     Assessment:   Mild obstructive sleep apnea, severe in the supine position  Sleep associated hypoxemia was not present.    Recommendations:  Consider auto-CPAP at 5-15 cmH2O, oral appliance therapy, positional therapy or surgical options.  Suggest optimizing sleep hygiene and avoiding sleep deprivation.  Weight management.    Diagnosis Code(s): Obstructive Sleep Apnea G47.33    Nelson Kang MD, November 15, 2022   Diplomate, American Board of Internal Medicine, Sleep Medicine

## 2022-11-21 ASSESSMENT — SLEEP AND FATIGUE QUESTIONNAIRES
HOW LIKELY ARE YOU TO NOD OFF OR FALL ASLEEP WHILE LYING DOWN TO REST IN THE AFTERNOON WHEN CIRCUMSTANCES PERMIT: SLIGHT CHANCE OF DOZING
HOW LIKELY ARE YOU TO NOD OFF OR FALL ASLEEP IN A CAR, WHILE STOPPED FOR A FEW MINUTES IN TRAFFIC: WOULD NEVER DOZE
HOW LIKELY ARE YOU TO NOD OFF OR FALL ASLEEP WHILE WATCHING TV: SLIGHT CHANCE OF DOZING
HOW LIKELY ARE YOU TO NOD OFF OR FALL ASLEEP WHILE SITTING AND READING: SLIGHT CHANCE OF DOZING
HOW LIKELY ARE YOU TO NOD OFF OR FALL ASLEEP WHILE SITTING AND TALKING TO SOMEONE: WOULD NEVER DOZE
HOW LIKELY ARE YOU TO NOD OFF OR FALL ASLEEP WHILE SITTING INACTIVE IN A PUBLIC PLACE: WOULD NEVER DOZE
HOW LIKELY ARE YOU TO NOD OFF OR FALL ASLEEP WHEN YOU ARE A PASSENGER IN A CAR FOR AN HOUR WITHOUT A BREAK: WOULD NEVER DOZE
HOW LIKELY ARE YOU TO NOD OFF OR FALL ASLEEP WHILE SITTING QUIETLY AFTER LUNCH WITHOUT ALCOHOL: WOULD NEVER DOZE

## 2022-11-22 ENCOUNTER — VIRTUAL VISIT (OUTPATIENT)
Dept: SLEEP MEDICINE | Facility: CLINIC | Age: 42
End: 2022-11-22
Payer: COMMERCIAL

## 2022-11-22 VITALS — WEIGHT: 265 LBS | HEIGHT: 73 IN | BODY MASS INDEX: 35.12 KG/M2

## 2022-11-22 DIAGNOSIS — G47.33 OSA (OBSTRUCTIVE SLEEP APNEA): Primary | ICD-10-CM

## 2022-11-22 PROCEDURE — 99213 OFFICE O/P EST LOW 20 MIN: CPT | Mod: 95 | Performed by: PHYSICIAN ASSISTANT

## 2022-11-22 ASSESSMENT — PAIN SCALES - GENERAL: PAINLEVEL: NO PAIN (0)

## 2022-11-22 NOTE — PROGRESS NOTES
"Dean is a 42 year old who is being evaluated via a billable video visit.      How would you like to obtain your AVS? MyChart  If the video visit is dropped, the invitation should be resent by: Text to cell phone: [phone]  Will anyone else be joining your video visit? Genesis        Mildred Lucas  Video-Visit Details    Video Start Time: 3:00 PM    Type of service:  Video Visit    Video End Time:3:4 PM    Originating Location (pt. Location): Home        Distant Location (provider location):  On-site    Platform used for Video Visit: [employer] + phone    Home Sleep Apnea Testing Results Visit:    Chief Complaint   Patient presents with     Video Visit     Follow Up        Peewee Walsh is a 42 year old male who returns to [facility] after having had Home Sleep Apnea Testing.  He presented with loud snoring, witnessed apnea, difficulty maintaining sleep and family history of sleep apnea. The STOP-BANG score is 4/8.    Total Recording Time: 8 hrs, 11 min  Total Sleep Time: 6 hrs, 57 min  % of Sleep Time REM: 23%    Home Sleep Apnea Testing (WatchPAt) - 11/21/22: 265 lbs 0 oz: pAHI 8/hr.  KURTIS 5 events per hour. REM pAHI 19/hr.     Mean oxygen saturation was 94%.  Minimum was 87%.  Time with saturation less than 88% was 0.3 minutes.     Heart Rate: By pulse oximetry normal rate was noted.      Position: Percent of time spent: supine - 7%, prone - <1%, on right - 0%, on left - 93%.  pAHI was 35 per hour supine, n/a per hour prone, n/a per hour on right side, and 6 per hour on left side.      Peewee Walsh reports that he slept Fair .     Home Sleep Apnea Testing was reviewed in detail today with Peewee and a copy given to him for his records.    Past medical/surgical history, family history, social history, medications and allergies were reviewed.      Ht 1.854 m (6' 1\")   Wt 120.2 kg (265 lb)   BMI 34.96 kg/m      Impression/Plan:  Mild Obstructive Sleep Apnea.   Sleep associated " hypoxemia was not present.     Treatment options discussed today including no treatment, auto-CPAP at 6-15 cmH2O, oral appliance therapy, positional therapy, polysomnography with full night PAP titration or surgical options.    He will think about his options and let us know via [employer].If he decides to go with CPAP, I will order auto-CPAP 6-15 cm/H20.    At this time no follow up appointments scheduled.      22 minutes spent on day of encounter doing chart review,  history and exam, counseling, coordinating plan of care, documentation and further activities as noted above.     Jackie Aguilera PA-C

## 2022-11-22 NOTE — PATIENT INSTRUCTIONS
You have mild sleep apnea. Following are extensive details of treatment options:      THINGS TO REMEMBER  In most situations, sleep apnea is a lifelong disease that must be managed with daily therapy. Untreated disease, when severe, may result in an increased risk for an array of problems from heart disease to mood changes, car accidents and shorter lifespan.    CPAP -  WHY AND HOW?  Continuous positive airway pressure, or CPAP, is the most effective treatment for obstructive sleep apnea. A decision to use CPAP is a major step forward in the pursuit of a healthier life. The successful use of CPAP will help you breathe easier, sleep better and live healthier. Using CPAP can be a positive experience if you keep these chavarria points in mind:  Commitment  CPAP is not a quick fix for your problem. It involves a long-term commitment to improve your sleep and your health.    Communication  Stay in close communication with both your sleep doctor and your CPAP supplier. Ask lots of questions and seek help when you need it.    Consistency  Use CPAP all night, every night and for every nap. You will receive the maximum health benefits from CPAP when you use it every time that you sleep. This will also make it easier for your body to adjust to the treatment.    Correction  The first machine and mask that you try may not be the best ones for you. Work with your sleep doctor and your CPAP supplier to make corrections to your equipment selection. Ask about trying a different type of machine or mask if you have ongoing problems. Make sure that your mask is a good fit and learn to use your equipment properly.    Challenge  Tell a family member or close friend to ask you each morning if you used your CPAP the previous night. Have someone to challenge you to give it your best effort.    Connection   Your adjustment to CPAP will be easier if you are able to connect with others who use the same treatment. Ask your sleep doctor if there is a  "support group in your area for people who have sleep apnea, or look for one on the Internet.    Comfort   Increase your level of comfort by using a saline spray, decongestant or heated humidifier if CPAP irritates your nose, mouth or throat. Use your unit's \"ramp\" setting to slowly get used to the air pressure level. There may be soft pads you can buy that will fit over your mask straps. Look on www.CPAP.com for accessories such as these straps, a pillow contoured for side-sleeping with CPAP, longer hoses, hose covers to reduce condensation, or stands to keep the hose out of your way.                                 Cleaning   Clean your mask, tubing and headgear on a regular basis. Put this time in your schedule so that you don't forget to do it. Check and replace the filters for your CPAP unit and humidifier.    Completion   Although you are never finished with CPAP therapy, you should reward yourself by celebrating the completion of your first month of treatment. Expect this first month to be your hardest period of adjustment. It will involve some trial and error as you find the machine, mask and pressure settings that are right for you.    Continuation  After your first month of treatment, continue to make a daily commitment to use your CPAP all night, every night and for every nap.    CPAP-Tips to starting with success:  Begin using your CPAP for short periods of time during the day while you watch TV or read.    Use CPAP every night and for every nap. Using it less often reduces the health benefits and makes it harder for your body to get used to it.    Newer CPAP models are virtually silent; however, if you find the sound of your CPAP machine to be bothersome, place the unit under your bed to dampen the sound.     Make small adjustments to your mask, tubing, straps and headgear until you get the right fit. Tightening the mask may actually worsen the leak.  If it leaves significant marks on your face or " irritates the bridge of your nose, it may not be the best mask for you.  Speak with the person who supplied the mask and consider trying other masks.    Use a saline nasal spray to ease mild nasal congestion. Neti-Pot or saline nasal rinses may also help. Nasal gel sprays can help reduce nasal dryness.  Biotene mouthwash can be helpful to protect your teeth if you experience frequent dry mouth.  Dry mouth may be a sign of air escaping out of your mouth or out of the mask in the case of a full face mask.  Speak with your provider if you expect that is the case.     Take a nasal decongestant to relieve more severe nasal or sinus congestion.  Do not use Afrin (oxymetazoline) nasal spray more than 3 days in a row.  Speak with your sleep doctor if your nasal congestion is chronic.    Use a heated humidifier that fits your CPAP model to enhance your breathing comfort. Adjust the heat setting up if you get a dry nose or throat, down if you get condensation in the hose or mask.  Position the CPAP lower than you so that any condensation in the hose drains back into the machine rather than towards the mask.    Try a system that uses nasal pillows if traditional masks give you problems.    Clean your mask, tubing and headgear once a week. Make sure the equipment dries fully.    Regularly check and replace the filters for your CPAP unit and humidifier.    Work closely with your sleep doctor and your CPAP supplier to make sure that you have the machine, mask and air pressure setting that works best for you.    BESIDES CPAP, WHAT OTHER THERAPIES ARE THERE?  Postioning devices if you only have the problem on your back  Dental devices if your condition is mild  Nasal valves may be effective though experience is limited  Tongue Retaining Device if missing teeth precludes the use of a dental device  Weight loss if you are overweight  Surgery in limited cases where devices are not acceptable or there are problems with structures in  the nose and throat  If treated with one of these alternative options, further evaluation is necessary to ensure that the therapy is effective. This may require some form of testing.     Healthy Lifestyle:  Healthy diet, exercise and Limit alcohol: Not only will excessive alcohol increase your weight over time, but it irritates the throat tissues and make them swell, shrinking the airway and causing snoring. Drinking alcohol should be limited and stopped within 3-4 hours before going to bed.   Stop smoking: (Red swollen throat, heat, nicotine), also irritates and swells the airway, among numerous other negative health consequences.    Positioning Device  This example shows a pillow that straps around the waist. It may be appropriate for those whose sleep study shows milder sleep apnea that occurs primarily when lying flat on one's back. Preliminary studies have shown benefit but effectiveness at home should be verified.    Nasal Valves              Nasal valves may not be effective if you have frequent nasal congestion or have difficulty breathing through your nose. They may be an option for mild apnea if other options are not well tolerated. The efficacy of these devices is generally less than CPAP or oral appliances.  Oral Appliance  These are examples of two of many custom-made devices that are more likely to work in mild sleep apnea  Oral appliances are dental mouth pieces that fit very much like a sports mouth guards or removable orthodontic retainers. They are used to treat snoring  And obstructive sleep apnea . The device prevents the airway from collapsing by either holding the tongue or supporting the jaw in a forward position. Since oral appliances are non-invasive and easy to use, they may be considered as an early treatment option. Oral appliance therapy (OAT) involves the customization, selection, fabrication, fitting, adjustments and long-term follow-up care of specially designed oral devices, worn  during sleep, which reposition the lower jaw and tongue base forward to maintain an open airway.  Custom made oral appliances are proven to be more effective than over-the-counter devices. Therefore, the over-the-counter devices are recommended not to be used as a screening tool nor as a therapeutic option.  Who gets a dental device?  Oral appliance therapy can be used as an alternative to  CPAP therapy for the treatment of mild to moderate sleep apnea and for those patients who prefer OAT to CPAP. Oral appliance therapy is a first line therapy for the treatment of primary snoring. Additionally, OAT is an option for those that cannot tolerate CPAP as therapy or who have experienced insufficient surgical results.    Possible side effects?  Frequent but minor side effects include: excessive salivation, dry mouth, discomfort of teeth and jaw and temporary changes in the patient s bite.  Potential complications include: jaw pain, permanent occlusal changes and TMJ symptoms.  The above mentioned side effects and complications can be recognized and managed by dentists trained in dental sleep medicine.    Finding a dentist that practices dental sleep medicine  Specific training is available through the American Academy of Dental Sleep Medicine for dentists interested in working in the field of sleep. To find a dentist who is educated in the field of sleep and the use of oral appliances, near you, visit the Web site of the American Academy of Dental Sleep Medicine; also see http://www.accpstorage.org/newOrganization/patients/oralAppliances.pdf   To search for a dentist certified in these practices:  Http://aadsm.org/FindADentist.aspx?1  [email or web address]    Tongue Retaining Device               Tongue Retaining Devices are devices that generally  suction cup  onto the tongue preventing it from falling back into the back of the throat during sleep.  They may be an option  for people missing teeth, but can be uncomfortable. This particular device can be purchased online, but similar devices made by dentists fit more precisely and may be tolerated better. In general, they are rarely effective and often not very well tolerated.    Weight Loss:  Some patients may experience reduction or elimination of sleep apnea with weight loss.  Though there are significant health benefits from weight loss, long-term weight loss is very difficult to achieve- studies show success with dietary management in less that 10% of people.     If you are interested in dietary weight loss, you should review the options discussed at the National Institutes of Health patient information site:     Http:/www.health.nih.gov/topic/WeightLossDieting    Bariatric programs offer counseling in all methods of weight loss:    Http:/[email or web address]    Surgery:  There are a number of surgeries that have been attempted to treat apnea. In general, surgical options are usually reserved for cases in which there is a physical abnormality contributing to obstruction or other treatment options are ineffective or not tolerated. Most surgical options are either unreliable or quite invasive. One of the more common procedures is:  Uvulopalatopharyngoplasty: In this procedure, the uvula (the finger-like tissue that hangs in the back of the throat), part of the soft palate (the tissue that the uvula is attached to), and sometimes the tonsils or adenoids are removed. The efficacy of this surgery is around 30-50% .  After surgery, complications may include:  Sleepiness and sleep apnea related to post-surgery medication   Swelling, infection and bleeding   A sore throat and/or difficulty swallowing   Drainage of secretions into the nose and a nasal quality to the voice. English language speech does not seem to be affected by this surgery.   Narrowing of the airway in the nose and  "throat (hence constricting breathing) snoring and even iatrogenically caused sleep apnea. By cutting the tissues, excess scar tissue can \"tighten\" the airway and make it even smaller than it was before UPPP.  Patients who have had the uvula removed will become unable to correctly speak Welsh or any other language that has a uvular 'r' phoneme.    Surgeries to help resolve nasal congestion may help reduce the severity of apnea slightly. Nasal congestion does not cause apnea on its own, so these surgeries are usually not performed just for YFN.  They may be worth considering if the nasal congestion is significantly bothersome independent of apnea.   "

## 2022-12-30 ENCOUNTER — MYC MEDICAL ADVICE (OUTPATIENT)
Dept: SLEEP MEDICINE | Facility: CLINIC | Age: 42
End: 2022-12-30

## 2023-01-04 ENCOUNTER — MYC MEDICAL ADVICE (OUTPATIENT)
Dept: INTERNAL MEDICINE | Facility: CLINIC | Age: 43
End: 2023-01-04

## 2023-01-04 DIAGNOSIS — E66.01 CLASS 2 SEVERE OBESITY DUE TO EXCESS CALORIES WITH SERIOUS COMORBIDITY IN ADULT, UNSPECIFIED BMI (H): Primary | Chronic | ICD-10-CM

## 2023-01-04 DIAGNOSIS — E66.812 CLASS 2 SEVERE OBESITY DUE TO EXCESS CALORIES WITH SERIOUS COMORBIDITY IN ADULT, UNSPECIFIED BMI (H): Primary | Chronic | ICD-10-CM

## 2023-01-05 NOTE — TELEPHONE ENCOUNTER
"Dr Hein, please advise if pt should be seen in clinic first or referral can be ordered. Per last office visit notes from 4/15/22, patient is to \"Return in about 6 months (around 10/15/2022) for Follow up.\" Patient is overdue for follow up.    MARÍA SheltonN, RN  [facility]        "

## 2023-01-18 ENCOUNTER — MYC MEDICAL ADVICE (OUTPATIENT)
Dept: SLEEP MEDICINE | Facility: CLINIC | Age: 43
End: 2023-01-18
Payer: COMMERCIAL

## 2023-01-19 NOTE — TELEPHONE ENCOUNTER
Treatment options were given to patient 12/30/22. Patient is requesting advise on options. No follow up scheduled. Please advise if appointment is needed.     Lisa NELSON RN  [facility]

## 2023-02-24 ENCOUNTER — OFFICE VISIT (OUTPATIENT)
Dept: FAMILY MEDICINE | Facility: CLINIC | Age: 43
End: 2023-02-24
Payer: COMMERCIAL

## 2023-02-24 VITALS
TEMPERATURE: 98.6 F | SYSTOLIC BLOOD PRESSURE: 159 MMHG | HEART RATE: 87 BPM | WEIGHT: 272.44 LBS | RESPIRATION RATE: 16 BRPM | DIASTOLIC BLOOD PRESSURE: 97 MMHG | BODY MASS INDEX: 36.9 KG/M2 | HEIGHT: 72 IN | OXYGEN SATURATION: 99 %

## 2023-02-24 DIAGNOSIS — R03.0 BLOOD PRESSURE ELEVATED WITHOUT HISTORY OF HTN: ICD-10-CM

## 2023-02-24 DIAGNOSIS — E66.01 CLASS 2 SEVERE OBESITY DUE TO EXCESS CALORIES WITH SERIOUS COMORBIDITY AND BODY MASS INDEX (BMI) OF 36.0 TO 36.9 IN ADULT (H): Primary | ICD-10-CM

## 2023-02-24 DIAGNOSIS — E66.812 CLASS 2 SEVERE OBESITY DUE TO EXCESS CALORIES WITH SERIOUS COMORBIDITY AND BODY MASS INDEX (BMI) OF 36.0 TO 36.9 IN ADULT (H): Primary | ICD-10-CM

## 2023-02-24 DIAGNOSIS — E88.810 METABOLIC SYNDROME X: ICD-10-CM

## 2023-02-24 DIAGNOSIS — E78.5 DYSLIPIDEMIA: ICD-10-CM

## 2023-02-24 DIAGNOSIS — G47.33 OSA (OBSTRUCTIVE SLEEP APNEA): ICD-10-CM

## 2023-02-24 PROBLEM — R53.82 CHRONIC FATIGUE: Status: RESOLVED | Noted: 2018-12-13 | Resolved: 2023-02-24

## 2023-02-24 PROBLEM — G89.29 CHRONIC HEADACHES: Status: RESOLVED | Noted: 2017-12-01 | Resolved: 2023-02-24

## 2023-02-24 PROBLEM — R51.9 CHRONIC HEADACHES: Status: RESOLVED | Noted: 2017-12-01 | Resolved: 2023-02-24

## 2023-02-24 PROCEDURE — 99215 OFFICE O/P EST HI 40 MIN: CPT | Performed by: FAMILY MEDICINE

## 2023-02-24 RX ORDER — SEMAGLUTIDE 0.25 MG/.5ML
0.25 INJECTION, SOLUTION SUBCUTANEOUS WEEKLY
Qty: 2 ML | Refills: 0 | Status: SHIPPED | OUTPATIENT
Start: 2023-02-24 | End: 2023-03-18

## 2023-02-24 NOTE — PATIENT INSTRUCTIONS
Macronutrient Goals    Minimum 3 meals per day, control daily calories   - 1600? male  Minimum of 4 palm servings of protein daily.  Begin everyday with protein    - ~80 gm for female   - 150+ gm for male  Be mindful of net carbs 50-75 gm/day  - (Total carbs - fiber)   - Less than 5 gm of carbs with breakfast

## 2023-02-24 NOTE — PROGRESS NOTES
"    New Medical Weight Management Consult    PATIENT:  Peewee Walsh  MRN:         [id number]  :         1980  ROGER:         2023    Dear Dr. Hein,    I had the pleasure of seeing your patient, Peewee Walsh. Full intake/assessment was done to determine barriers to weight loss success and develop a trefatment plan. Peewee Walsh is a 42 year old male interested in treatment of medical problems associated with excess weight. He has a height of 6' .3\", a weight of 272 lbs 7 oz, and the calculated Body mass index is 36.64 kg/m .    ASSESSMENT/PLAN:  Class 2 severe obesity due to excess calories with serious comorbidity and body mass index (BMI) of 36.0 to 36.9 in adult (H)  42 year old year old male in clinic today to discuss treatment of the following conditions through diet and lifestyle modification and weight loss:  1. Class 2 severe obesity due to excess calories with serious comorbidity and body mass index (BMI) of 36.0 to 36.9 in adult (H)    2. Metabolic syndrome X    3. Blood pressure elevated without history of HTN    4. Dyslipidemia    5. YFN (obstructive sleep apnea)      Intake: 42-year-old man with history of obesity, fluctuations of blood pressure (likely hypertension), untreated obstructive sleep apnea presenting for discussion of medical weight loss.  This patient loosely meets criteria for metabolic syndrome.  He believes he is eating well overall but attributes weight concerns to excessive portions.  Eats a late breakfast in general as well as a dinner with his spouse.  He rarely drinks his calories in any form.  We do lengthy conversation regarding metabolic health.  He was a bit reticent to expand on nutrition and believes that he is eating fairly well at this time.  I suspect that there is some dysregulation and portions I think it is reasonable to consider medication to help with weight loss.  We reviewed various options.  I believe a GLP-1 receptor agonist will " "result in him feeling full sooner during his meals.  There is no personal/family history of thyroid cancer.  No personal history of pancreatitis.  - Trial of semaglutide (Wegovy).  We will titrate.  Anticipate he will get coverage by insurance.  - He will meet with the dietitian.  He is participating in comprehensive weight management through  [email or web address].  - I would like to see the patient back in approximately 8 weeks to review medications.  - We will plan to recheck blood pressure.  The patient was quite upset at the time of the blood pressure measurement because of the questionnaires that he was asked to complete.  - YFN?  From my read, looks like he has been offered CPAP but the patient has expressed some frustration that he was not given a definitive recommendation.  We will work to lose weight and treat his mild obstructive sleep apnea through lifestyle changes.      FOLLOW-UP:   8 weeks.    SUBJECTIVE:     He has the following co-morbidities:       2/24/2023   I have the following health issues associated with obesity: None of the above   I have the following symptoms associated with obesity: Knee Pain     Narrative History:    - in the past he has lost weight with intense physical activity. Now works at a computer.   - goal: lose weight to relieve knees.   - Snores.  Not on cpap.      Patient Goals 2/24/2023   I am interested in having a healthier weight to diminish current health problems: Yes   I am interested in having a healthier weight in order to prevent future health problems: Yes   I am interested in having a healthier weight in order to have a future surgery: No     Referring Provider 2/24/2023   Please name the provider who referred you to Medical Weight Management.  If you do not know, please answer: \"I Don't Know\". Earnest peña     Weight History 2/24/2023   How concerned are you about your weight? Very Concerned   I became overweight: As a Child   The following factors have " contributed to my weight gain:  Eating Too Much, Lack of Exercise, Genetic (Runs in the Family)   I have tried the following methods to lose weight: Watching Portions or Calories, Exercise   My lowest weight since age 19 was: 225   My highest weight since age 19 was: 280   The most weight I have ever lost was: (lbs) 45   I have the following family history of obesity/being overweight:  My mother is overweight, My father is overweight, One or more of my siblings are overweight, Many of my relatives are overweight   Has anyone in your family had weight loss surgery? No   How has your weight changed over the last year?  Gained     Diet Recall Review with Patient 2/24/2023   Do you typically eat breakfast? Yes   If you do eat breakfast, what types of food do you eat? Oatmeal or microwave meals.   Do you typically eat lunch? No   Do you typically eat supper? Yes   If you do eat supper, what types of food do you typically eat? Meats and pasta.   Do you typically eat snacks? No   Do you like vegetables?  Yes   Do you drink water? Yes   How many glasses of juice do you drink in a typical day? 0   How many of glasses of milk do you drink in a typical day? 0   If you do drink milk, what type? N/A   How many 8oz glasses of sugar containing drinks such as [employer]/sweet tea do you drink in a day? 0   How many cans/bottles of sugar pop/soda/tea/sports drinks do you drink in a day? 0.5   How many cans/bottles of diet pop/soda/tea or sports drink do you drink in a day? 0   How often do you have a drink of alcohol? Monthly or Less   If you do drink, how many drinks might you have in a day? 5-6     Eating Habits 2/24/2023   Generally, my meals include foods like these: bread, pasta, rice, potatoes, corn, crackers, sweet dessert, pop, or juice. A Few Times a Week   Generally, my meals include foods like these: fried meats, brats, burgers, french fries, pizza, cheese, chips, or ice cream. Almost Everyday   Eat fast food (like  [employer], [employer], [employer]aco [employer]). Less Than Weekly   Eat at a buffet or sit-down restaurant. Less Than Weekly   Eat most of my meals in front of the TV or computer. Almost Everyday   Often skip meals, eat at random times, have no regular eating times. Everyday   Rarely sit down for a meal but snack or graze throughout.  Never   Eat extra snacks between meals. Once a Week   Eat most of my food at the end of the day. Almost Everyday   Eat in the middle of the night or wake up at night to eat. Never   Eat extra snacks to prevent or correct low blood sugar. Never   Eat to prevent acid reflux or stomach pain. Never   Worry about not having enough food to eat. Never   Have you been to the food shelf at least a few times this year? No   I eat when I am depressed. Never   I eat when I am stressed. Never   I eat when I am bored. Never   I eat when I am anxious. Never   I eat when I am happy or as a reward. Never   I feel hungry all the time even if I just have eaten. Never   Feeling full is important to me. Almost Everyday   I finish all the food on my plate even if I am already full. Everyday   I can't resist eating delicious food or walk past the good food/smell. Never   I eat/snack without noticing that I am eating. Never   I eat when I am preparing the meal. Less Than Weekly   I eat more than usual when I see others eating. Never   I have trouble not eating sweets, ice cream, cookies, or chips if they are around the house. Never   I think about food all day. Never   What foods, if any, do you crave? None   Please list any other foods you crave? Meat.     Amount of Food 2/24/2023   I make myself vomit what I have eaten or use laxatives to get rid of food. Never   I eat a large amount of food, like a loaf of bread, a box of cookies, a pint/quart of ice cream, all at once. Never   I eat a large amount of food even when I am not hungry. Never   I eat rapidly. Weekly   I eat alone because I feel embarrassed and do not want  others to see how much I have eaten. Never   I eat until I am uncomfortably full. Weekly   I feel bad, disgusted, or guilty after I overeat. Never   I make myself vomit what I have eaten or use laxatives to get rid of food. Never     Activity/Exercise History 2/24/2023   How much of a typical 12 hour day do you spend sitting? Half the Day   How much of a typical 12 hour day do you spend lying down? Less Than Half the Day   How much of a typical day do you spend walking/standing? Half the Day   How many hours (not including work) do you spend on the TV/Video Games/Computer/Tablet/Phone? 2-3 Hours   How many times a week are you active for the purpose of exercise? Never   What keeps you from being more active? Pain   How many total minutes do you spend doing some activity for the purpose of exercising when you exercise? None     PAST MEDICAL HISTORY:  Past Medical History:   Diagnosis Date     Acute cervical radiculopathy 11/23/2015     Anemia      Blood pressure elevated without history of HTN 04/15/2022     Chronic elbow pain, right 01/02/2020     Chronic headaches 12/01/2017     Chronic pain of both knees 04/15/2022     Chronic pain of right knee 01/07/2021     Chronic right shoulder pain 11/23/2015     Class 2 obesity in adult 04/15/2022     Community acquired pneumonia     2018     Elevated blood pressure      Hand pain, right 10/03/2017    Tendinitis versus carpal tunnel     Hypertension      Left ankle injury 2010    High-grade tear anterior talofibular ligament     Left foot pain 11/23/2015     Obesity (BMI 30.0-34.9) 12/01/2017     Plantar fasciitis, bilateral 11/23/2015     Shoulder separation      Snoring 12/01/2017     Suspected sleep apnea 04/15/2022     Work/Social History Reviewed With Patient 2/24/2023   My employment status is: Full-Time   My job is:    How much of your job is spent on the computer or phone? 75%   How many hours do you spend commuting to work daily?  1    What is your marital status? /In a Relationship   If in a relationship, is your significant other overweight? Yes   Do you have children? No   If you have children, are they overweight? N/A   Who do you live with?  My wife.   Are they supportive of your health goals? Yes   Who does the food shopping?  My wife     Mental Health History Reviewed With Patient 2/24/2023   Have you ever been physically or sexually abused? No   If yes, do you feel that the abuse is affecting your weight? N/A   If yes, would you like to talk to a counselor about the abuse? N/A   How often in the past 2 weeks have you felt little interest or pleasure in doing things? Not at all   Over the past 2 weeks how often have you felt down, depressed, or hopeless? Not at all     Sleep History Reviewed With Patient 2/24/2023   How many hours do you sleep at night? 7   Do you think that you snore loudly or has anybody ever heard you snore loudly (louder than talking or so loud it can be heard behind a shut door)? Yes   Has anyone seen or heard you stop breathing during your sleep? Yes   Do you often feel tired, fatigued, or sleepy during the day? Yes   Do you have a TV/Computer in your bedroom? No     MEDICATIONS:   Current Outpatient Medications   Medication Sig Dispense Refill     Semaglutide-Weight Management (WEGOVY) 0.25 MG/0.5ML pen Inject 0.25 mg Subcutaneous once a week for 4 doses 2 mL 0     ALLERGIES:   No Known Allergies  PHYSICAL EXAM:  Physical Exam  Nursing note reviewed.   Constitutional:       General: He is not in acute distress.     Appearance: Normal appearance. He is not ill-appearing.   HENT:      Head: Normocephalic and atraumatic.   Eyes:      Extraocular Movements: Extraocular movements intact.      Conjunctiva/sclera: Conjunctivae normal.   Pulmonary:      Effort: Pulmonary effort is normal.   Neurological:      Mental Status: He is alert and oriented to person, place, and time.   Psychiatric:         Attention and  Perception: Attention normal.         Mood and Affect: Mood normal.         Speech: Speech normal.         Thought Content: Thought content normal.       60 minutes spent on the date of the encounter doing chart review, history and exam, documentation and further activities per the note    This note has been dictated using voice recognition software. Any grammatical or context distortions are unintentional and inherent to the software    Sincerely,    Doc De Oliveira MD  Diplomate - American Board of Obesity Medicine  Diplomate - American Board of Family Medicine  [facility] - Comprehensive Weight Management

## 2023-02-24 NOTE — Clinical Note
This patient is on your schedule early next week.  Please let me know how your visit goes.  My visit with him on Friday was challenging for a number of reasons but he seemed fairly resistant to conversation around nutrition.  He upset with just about everyone but especially clinic staff during the rooming process.

## 2023-02-25 PROBLEM — G47.33 OSA (OBSTRUCTIVE SLEEP APNEA): Status: ACTIVE | Noted: 2022-04-15

## 2023-02-25 PROBLEM — R06.83 SNORING: Status: RESOLVED | Noted: 2017-12-01 | Resolved: 2023-02-25

## 2023-02-25 NOTE — ASSESSMENT & PLAN NOTE
42 year old year old male in clinic today to discuss treatment of the following conditions through diet and lifestyle modification and weight loss:  1. Class 2 severe obesity due to excess calories with serious comorbidity and body mass index (BMI) of 36.0 to 36.9 in adult (H)    2. Metabolic syndrome X    3. Blood pressure elevated without history of HTN    4. Dyslipidemia    5. YFN (obstructive sleep apnea)      Intake: 42-year-old man with history of obesity, fluctuations of blood pressure (likely hypertension), untreated obstructive sleep apnea presenting for discussion of medical weight loss.  This patient loosely meets criteria for metabolic syndrome.  He believes he is eating well overall but attributes weight concerns to excessive portions.  Eats a late breakfast in general as well as a dinner with his spouse.  He rarely drinks his calories in any form.  We do lengthy conversation regarding metabolic health.  He was a bit reticent to expand on nutrition and believes that he is eating fairly well at this time.  I suspect that there is some dysregulation and portions I think it is reasonable to consider medication to help with weight loss.  We reviewed various options.  I believe a GLP-1 receptor agonist will result in him feeling full sooner during his meals.  There is no personal/family history of thyroid cancer.  No personal history of pancreatitis.  - Trial of semaglutide (Wegovy).  We will titrate.  Anticipate he will get coverage by insurance.  - He will meet with the dietitian.  He is participating in comprehensive weight management through [facility].  - I would like to see the patient back in approximately 8 weeks to review medications.  - We will plan to recheck blood pressure.  The patient was quite upset at the time of the blood pressure measurement because of the questionnaires that he was asked to complete.  - YFN?  From my read, looks like he has been offered CPAP but the patient has  expressed some frustration that he was not given a definitive recommendation.  We will work to lose weight and treat his mild obstructive sleep apnea through lifestyle changes.

## 2023-02-27 ENCOUNTER — VIRTUAL VISIT (OUTPATIENT)
Dept: SURGERY | Facility: CLINIC | Age: 43
End: 2023-02-27
Payer: COMMERCIAL

## 2023-02-27 DIAGNOSIS — E66.9 OBESITY (BMI 30-39.9): Primary | ICD-10-CM

## 2023-02-27 DIAGNOSIS — E88.810 METABOLIC SYNDROME X: ICD-10-CM

## 2023-02-27 PROCEDURE — 97802 MEDICAL NUTRITION INDIV IN: CPT | Mod: VID

## 2023-02-27 NOTE — LETTER
2/27/2023         RE: Peewee Walsh  [address] [address] [id number]        Dear Colleague,    Thank you for referring your patient, Peewee Walsh, to the [facility] [place]. Please see a copy of my visit note below.    Peewee Walsh is a 42 year old who is being evaluated via a billable video visit.            Medical Weight Loss Initial Diet Evaluation  Assessment:  Peewee is presenting today for a new weight management nutrition consultation. Pt has had an initial appointment with Dr. Marcano .  Weight loss medication: Wegovy. - waiting on insurance approval.     Personal Goals: Wants to lose weight to be able to walk more comfortably as he has significant knee pain.      Anthropometrics:    Initial weight: 272.7 lbs     BMI: 36.64 kg/m2  Ideal body weight: 78.3 kg (172 lb 9.6 oz)  Adjusted ideal body weight: 96.4 kg (212 lb 8.5 oz)  Estimated RMR ([place] equation):  2000 kcals x 1.2 (sedentary) = 2400 kcals (for weight maintenance)    Recommended Protein Intake:  grams of protein/day    Medical History:  Patient Active Problem List   Diagnosis     Chronic right shoulder pain     YFN (obstructive sleep apnea)     Chronic pain of both knees     Class 2 severe obesity due to excess calories with serious comorbidity and body mass index (BMI) of 36.0 to 36.9 in adult (H)     Blood pressure elevated without history of HTN     Metabolic syndrome X      Diabetes: No  HbA1c:  No results found for: HGBA1C    Nutrition History:   Food allergies/intolerances/cultural or religous food customs: No     Weight loss history: Increased physical activity in the past for weight loss. Hx of aiming to burn an excessive amount of calories via exercise and consume a calorie deficit. Patient reports hx of eating 200-300g protein per day.  States his consern is portion control and feels he can not stop eating. Once he starts he has difficulty  stopping. Does not feel eating breakfast is beneficial to him as he states he has done it in the past but had no benefit.    Vitamins/Mineral Supplementation: none currently     Dietary Recall:    Fist meal is usually 11-12 pm     Breakfast/Lunch: oatmeal 300-400 kcal Or breakfast scrambles with added protein   Dinner: protein with occasional carb or tacos with rice   Typical Snacks: if he eats lunch at 1-2 pm microwave meal or leftovers    Overnight eating: No  Eating out: 1x per week.    Beverages: coffee with cream and sugar, water     Exercise: no routine at this time d/t knee pain.     Nutrition Diagnosis (PES statement):   Overweight/Obesity (NC 3.3) related to overeating and poor lifestyle habits as evidenced by patient report of skipping meals, not having a vegetable sources at meals, no structured physical activity outside of ADL and BMI 36.64 kg/m2      Nutrition Intervention  1. Food and/or Nutrient Delivery   a. Placed emphasis on importance of developing a healthy meal routine, aiming for 3 meals a day and no snacks.  b. Discussed using a protein supplement as a meal replacement.  2. Nutrition Education   a. Discussed with patient how to build a meal: the importance of including a lean/low fat protein at each meal, include a source of vegetables at a minimum of lunch and dinner and limiting carbohydrate intake.  b. Educated on sources of lean protein, portion sizes, the amount of grams found in each source. Recommend patient to aim for 20-30g protein at each meal.  c. Educated patient on the benefits from eating within 1-2 hours of waking up.  3. Nutrition Counseling   a. Discussed mindful eating techniques such as eating off smaller places/bowls, taking 20-30 minutes to eat in a calm/relaxed environment without distractions.      Goals established by patient:   1. Aim to have 1 serving vegetable sources per day.  2. monitor portion sizes  3. Aim for ~80-100g protein per day. (25-35g per meal)        Handouts provided:  Protein sources  My plate   High protein breakfast ideas.     Assessment/Plan:    Did not want to make a follow up at this time with  or SHEREE.      Video-Visit Details    Type of service:  Video Visit    Video Start Time (time video started): 4:03 pm    Video End Time (time video stopped): 4:33 pm    Originating Location (pt. Location): Home        Distant Location (provider location):  Off-site    Mode of Communication:  Video Conference via [facility]    Physician has received verbal consent for a Video Visit from the patient? Yes      Daphne Lucas RD          Again, thank you for allowing me to participate in the care of your patient.        Sincerely,        Daphne Lucas RD

## 2023-02-27 NOTE — PROGRESS NOTES
Peewee Walsh is a 42 year old who is being evaluated via a billable video visit.            Medical Weight Loss Initial Diet Evaluation  Assessment:  Peewee is presenting today for a new weight management nutrition consultation. Pt has had an initial appointment with Dr. Marcano .  Weight loss medication: Wegovy. - waiting on insurance approval.     Personal Goals: Wants to lose weight to be able to walk more comfortably as he has significant knee pain.      Anthropometrics:    Initial weight: 272.7 lbs     BMI: 36.64 kg/m2  Ideal body weight: 78.3 kg (172 lb 9.6 oz)  Adjusted ideal body weight: 96.4 kg (212 lb 8.5 oz)  Estimated RMR ([place] equation):  2000 kcals x 1.2 (sedentary) = 2400 kcals (for weight maintenance)    Recommended Protein Intake:  grams of protein/day    Medical History:  Patient Active Problem List   Diagnosis     Chronic right shoulder pain     YFN (obstructive sleep apnea)     Chronic pain of both knees     Class 2 severe obesity due to excess calories with serious comorbidity and body mass index (BMI) of 36.0 to 36.9 in adult (H)     Blood pressure elevated without history of HTN     Metabolic syndrome X      Diabetes: No  HbA1c:  No results found for: HGBA1C    Nutrition History:   Food allergies/intolerances/cultural or religous food customs: No     Weight loss history: Increased physical activity in the past for weight loss. Hx of aiming to burn an excessive amount of calories via exercise and consume a calorie deficit. Patient reports hx of eating 200-300g protein per day.  States his consern is portion control and feels he can not stop eating. Once he starts he has difficulty stopping. Does not feel eating breakfast is beneficial to him as he states he has done it in the past but had no benefit.    Vitamins/Mineral Supplementation: none currently     Dietary Recall:    Fist meal is usually 11-12 pm     Breakfast/Lunch: oatmeal 300-400 kcal Or breakfast scrambles  with added protein   Dinner: protein with occasional carb or tacos with rice   Typical Snacks: if he eats lunch at 1-2 pm microwave meal or leftovers    Overnight eating: No  Eating out: 1x per week.    Beverages: coffee with cream and sugar, water     Exercise: no routine at this time d/t knee pain.     Nutrition Diagnosis (PES statement):   Overweight/Obesity (NC 3.3) related to overeating and poor lifestyle habits as evidenced by patient report of skipping meals, not having a vegetable sources at meals, no structured physical activity outside of ADL and BMI 36.64 kg/m2      Nutrition Intervention  1. Food and/or Nutrient Delivery   a. Placed emphasis on importance of developing a healthy meal routine, aiming for 3 meals a day and no snacks.  b. Discussed using a protein supplement as a meal replacement.  2. Nutrition Education   a. Discussed with patient how to build a meal: the importance of including a lean/low fat protein at each meal, include a source of vegetables at a minimum of lunch and dinner and limiting carbohydrate intake.  b. Educated on sources of lean protein, portion sizes, the amount of grams found in each source. Recommend patient to aim for 20-30g protein at each meal.  c. Educated patient on the benefits from eating within 1-2 hours of waking up.  3. Nutrition Counseling   a. Discussed mindful eating techniques such as eating off smaller places/bowls, taking 20-30 minutes to eat in a calm/relaxed environment without distractions.      Goals established by patient:   1. Aim to have 1 serving vegetable sources per day.  2. monitor portion sizes  3. Aim for ~80-100g protein per day. (25-35g per meal)       Handouts provided:  Protein sources  My plate   High protein breakfast ideas.     Assessment/Plan:    Did not want to make a follow up at this time with  or RD.      Video-Visit Details    Type of service:  Video Visit    Video Start Time (time video started): 4:03 pm    Video End Time (time  video stopped): 4:33 pm    Originating Location (pt. Location): Home        Distant Location (provider location):  Off-site    Mode of Communication:  Video Conference via [facility]    Physician has received verbal consent for a Video Visit from the patient? Yes      Daphne Lucas RD

## 2023-03-08 ENCOUNTER — MYC MEDICAL ADVICE (OUTPATIENT)
Dept: FAMILY MEDICINE | Facility: CLINIC | Age: 43
End: 2023-03-08
Payer: COMMERCIAL

## 2023-03-29 NOTE — TELEPHONE ENCOUNTER
Left message to call back for: Patient  Information to relay to patient: Please help patient scheduled nurse only visit for BP check.

## 2023-04-26 ENCOUNTER — ALLIED HEALTH/NURSE VISIT (OUTPATIENT)
Dept: FAMILY MEDICINE | Facility: CLINIC | Age: 43
End: 2023-04-26
Payer: COMMERCIAL

## 2023-04-26 VITALS — SYSTOLIC BLOOD PRESSURE: 120 MMHG | DIASTOLIC BLOOD PRESSURE: 80 MMHG

## 2023-04-26 DIAGNOSIS — I10 BENIGN ESSENTIAL HYPERTENSION: Primary | ICD-10-CM

## 2023-04-26 PROCEDURE — 99207 PR NO CHARGE NURSE ONLY: CPT

## 2023-04-26 NOTE — PROGRESS NOTES
I met with Peewee Walsh at the request of Dr Hein to recheck his blood pressure.  Blood pressure medications on the med list were reviewed with patient.    Patient has taken all medications as per usual regimen: NA  Patient reports tolerating them without any issues or concerns: NA    There were no vitals filed for this visit.    Blood pressure was taken, previous encounter was reviewed, recorded blood pressure below 140/90.  Patient was discharged and the note will be sent to the provider for final review.

## 2023-04-27 ENCOUNTER — MYC MEDICAL ADVICE (OUTPATIENT)
Dept: FAMILY MEDICINE | Facility: CLINIC | Age: 43
End: 2023-04-27
Payer: COMMERCIAL

## 2023-04-27 DIAGNOSIS — E66.01 CLASS 2 SEVERE OBESITY DUE TO EXCESS CALORIES WITH SERIOUS COMORBIDITY AND BODY MASS INDEX (BMI) OF 36.0 TO 36.9 IN ADULT (H): Primary | ICD-10-CM

## 2023-04-27 DIAGNOSIS — E66.812 CLASS 2 SEVERE OBESITY DUE TO EXCESS CALORIES WITH SERIOUS COMORBIDITY AND BODY MASS INDEX (BMI) OF 36.0 TO 36.9 IN ADULT (H): Primary | ICD-10-CM

## 2023-04-27 DIAGNOSIS — E88.810 METABOLIC SYNDROME X: ICD-10-CM

## 2023-04-27 DIAGNOSIS — G47.33 OSA (OBSTRUCTIVE SLEEP APNEA): ICD-10-CM

## 2023-04-27 RX ORDER — TOPIRAMATE 25 MG/1
50 TABLET, FILM COATED ORAL
Qty: 60 TABLET | Refills: 1 | Status: SHIPPED | OUTPATIENT
Start: 2023-04-27 | End: 2023-05-26

## 2023-04-27 RX ORDER — PHENTERMINE HYDROCHLORIDE 15 MG/1
15 CAPSULE ORAL EVERY MORNING
Qty: 30 CAPSULE | Refills: 0 | Status: SHIPPED | OUTPATIENT
Start: 2023-04-27 | End: 2023-05-26

## 2023-04-27 NOTE — TELEPHONE ENCOUNTER
Class 2 severe obesity due to excess calories with serious comorbidity and body mass index (BMI) of 36.0 to 36.9 in adult (H)  Semaglutide and other GLP-1 receptor agonist therapy is not covered.  Blood pressure improved.  No contraindication to trial of phentermine/topiramate.  Patient should follow-up in 6 weeks.  He should let us know if he is doing well after the first few weeks of phentermine and topiramate and I will send in refills.

## 2023-04-27 NOTE — ASSESSMENT & PLAN NOTE
Semaglutide and other GLP-1 receptor agonist therapy is not covered.  Blood pressure improved.  No contraindication to trial of phentermine/topiramate.  Patient should follow-up in 6 weeks.  He should let us know if he is doing well after the first few weeks of phentermine and topiramate and I will send in refills.

## 2023-05-25 ENCOUNTER — MYC MEDICAL ADVICE (OUTPATIENT)
Dept: FAMILY MEDICINE | Facility: CLINIC | Age: 43
End: 2023-05-25
Payer: COMMERCIAL

## 2023-05-25 DIAGNOSIS — G47.33 OSA (OBSTRUCTIVE SLEEP APNEA): ICD-10-CM

## 2023-05-25 DIAGNOSIS — E88.810 METABOLIC SYNDROME X: ICD-10-CM

## 2023-05-25 DIAGNOSIS — E66.01 CLASS 2 SEVERE OBESITY DUE TO EXCESS CALORIES WITH SERIOUS COMORBIDITY AND BODY MASS INDEX (BMI) OF 36.0 TO 36.9 IN ADULT (H): ICD-10-CM

## 2023-05-25 DIAGNOSIS — E66.812 CLASS 2 SEVERE OBESITY DUE TO EXCESS CALORIES WITH SERIOUS COMORBIDITY AND BODY MASS INDEX (BMI) OF 36.0 TO 36.9 IN ADULT (H): ICD-10-CM

## 2023-05-26 RX ORDER — PHENTERMINE HYDROCHLORIDE 15 MG/1
15 CAPSULE ORAL EVERY MORNING
Qty: 90 CAPSULE | Refills: 0 | Status: SHIPPED | OUTPATIENT
Start: 2023-05-26 | End: 2023-06-16

## 2023-05-26 RX ORDER — TOPIRAMATE 25 MG/1
50 TABLET, FILM COATED ORAL
Qty: 60 TABLET | Refills: 1 | Status: SHIPPED | OUTPATIENT
Start: 2023-05-26 | End: 2023-06-16

## 2023-06-04 ENCOUNTER — HEALTH MAINTENANCE LETTER (OUTPATIENT)
Age: 43
End: 2023-06-04

## 2023-06-16 ENCOUNTER — OFFICE VISIT (OUTPATIENT)
Dept: FAMILY MEDICINE | Facility: CLINIC | Age: 43
End: 2023-06-16
Payer: COMMERCIAL

## 2023-06-16 VITALS
DIASTOLIC BLOOD PRESSURE: 90 MMHG | BODY MASS INDEX: 35.26 KG/M2 | RESPIRATION RATE: 16 BRPM | SYSTOLIC BLOOD PRESSURE: 135 MMHG | WEIGHT: 260.31 LBS | OXYGEN SATURATION: 99 % | HEART RATE: 82 BPM | TEMPERATURE: 98.2 F | HEIGHT: 72 IN

## 2023-06-16 DIAGNOSIS — E66.01 CLASS 2 SEVERE OBESITY DUE TO EXCESS CALORIES WITH SERIOUS COMORBIDITY AND BODY MASS INDEX (BMI) OF 35.0 TO 35.9 IN ADULT (H): Primary | ICD-10-CM

## 2023-06-16 DIAGNOSIS — E66.812 CLASS 2 SEVERE OBESITY DUE TO EXCESS CALORIES WITH SERIOUS COMORBIDITY AND BODY MASS INDEX (BMI) OF 35.0 TO 35.9 IN ADULT (H): Primary | ICD-10-CM

## 2023-06-16 DIAGNOSIS — R03.0 BLOOD PRESSURE ELEVATED WITHOUT HISTORY OF HTN: ICD-10-CM

## 2023-06-16 DIAGNOSIS — G47.33 OSA (OBSTRUCTIVE SLEEP APNEA): ICD-10-CM

## 2023-06-16 DIAGNOSIS — E88.810 METABOLIC SYNDROME X: ICD-10-CM

## 2023-06-16 PROCEDURE — 99214 OFFICE O/P EST MOD 30 MIN: CPT | Performed by: FAMILY MEDICINE

## 2023-06-16 RX ORDER — DIETHYLPROPION HYDROCHLORIDE 75 MG/1
75 TABLET, EXTENDED RELEASE ORAL DAILY
Qty: 30 TABLET | Refills: 0 | Status: SHIPPED | OUTPATIENT
Start: 2023-06-16 | End: 2023-10-05

## 2023-06-16 ASSESSMENT — ENCOUNTER SYMPTOMS: CONSTITUTIONAL NEGATIVE: 1

## 2023-06-16 NOTE — ASSESSMENT & PLAN NOTE
42 year old year old male in clinic today to discuss treatment of the following conditions through diet and lifestyle modification and weight loss:  1. Class 2 severe obesity due to excess calories with serious comorbidity and body mass index (BMI) of 35.0 to 35.9 in adult (H)    2. Blood pressure elevated without history of HTN    3. YFN (obstructive sleep apnea)    4. Metabolic syndrome X      The patient's weight loss result since the last visit was mixed based on weight loss but he has navigated a number of side effects. He is generally calorically restricted.  - reviewed macronutrients.  More AM protein for increased satiety.    - add exercise as able   - Side effects from topiramate.  Discontinue (or at least take a holiday from medication to evaluate side effects).    - change to diethylpropion.  Trial for one month.  [facility]

## 2023-06-16 NOTE — PROGRESS NOTES
"  Assessment & Plan   Problem List Items Addressed This Visit     Blood pressure elevated without history of HTN    Relevant Medications    Diethylpropion HCl CR 75 MG TB24    Class 2 severe obesity due to excess calories with serious comorbidity and body mass index (BMI) of 35.0 to 35.9 in adult (H) - Primary     42 year old year old male in clinic today to discuss treatment of the following conditions through diet and lifestyle modification and weight loss:  1. Class 2 severe obesity due to excess calories with serious comorbidity and body mass index (BMI) of 35.0 to 35.9 in adult (H)    2. Blood pressure elevated without history of HTN    3. YFN (obstructive sleep apnea)    4. Metabolic syndrome X      The patient's weight loss result since the last visit was mixed based on weight loss but he has navigated a number of side effects. He is generally calorically restricted.  - reviewed macronutrients.  More AM protein for increased satiety.    - add exercise as able   - Side effects from topiramate.  Discontinue (or at least take a holiday from medication to evaluate side effects).    - change to diethylpropion.  Trial for one month.  [facility]          Relevant Medications    Diethylpropion HCl CR 75 MG TB24    Metabolic syndrome X    Relevant Medications    Diethylpropion HCl CR 75 MG TB24    YFN (obstructive sleep apnea)    Relevant Medications    Diethylpropion HCl CR 75 MG TB24      BMI:   Estimated body mass index is 35.01 kg/m  as calculated from the following:    Height as of this encounter: 1.836 m (6' 0.3\").    Weight as of this encounter: 118.1 kg (260 lb 5 oz).   Weight management plan: Specific weight management program called [facility].  discussed    34 minutes of time spent in review of the EHR, discussion with patient and documentation.  This occurred on the date of service.     Doc De Oliveira MD  [facility] is a 42 " "year old, presenting for the following health issues:  Weight Loss (Follow-up/)        6/16/2023     1:38 PM   Additional Questions   Roomed by sac   Accompanied by self         6/16/2023     1:38 PM   Patient Reported Additional Medications   Patient reports taking the following new medications no     Patient presents for treatment of chronic, comorbid conditions using intensive therapeutic lifestyle interventions including nutrition, physical activity, and behavior management.   - successes: caloric deficit   - struggles: role for medication: \"I hate it.\"  Food tastes bad. Tired today.     - exercise plan: active   - tracking/journaling: ad sonia.  Portion controlled.    - following nutritional plan: generally yes.  Deviations from plan: infrequent.    - hunger: worsens as the night    - medication benefits: ? side effects: food tastes poor.        Review of Systems   Constitutional: Negative.    All other systems reviewed and are negative.         Objective    BP (!) 135/90 (BP Location: Left arm, Patient Position: Sitting, Cuff Size: Adult Large)   Pulse 82   Temp 98.2  F (36.8  C) (Oral)   Resp 16   Ht 1.836 m (6' 0.3\")   Wt 118.1 kg (260 lb 5 oz)   SpO2 99%   BMI 35.01 kg/m    Body mass index is 35.01 kg/m .  Physical Exam  Nursing note reviewed.   Constitutional:       General: He is not in acute distress.     Appearance: Normal appearance. He is not ill-appearing.   HENT:      Head: Normocephalic and atraumatic.   Eyes:      Extraocular Movements: Extraocular movements intact.      Conjunctiva/sclera: Conjunctivae normal.   Pulmonary:      Effort: Pulmonary effort is normal.   Neurological:      Mental Status: He is alert and oriented to person, place, and time.   Psychiatric:         Attention and Perception: Attention normal.         Mood and Affect: Mood normal.         Speech: Speech normal.         Thought Content: Thought content normal.                      "

## 2023-07-18 ENCOUNTER — MYC MEDICAL ADVICE (OUTPATIENT)
Dept: FAMILY MEDICINE | Facility: CLINIC | Age: 43
End: 2023-07-18
Payer: COMMERCIAL

## 2023-09-21 ENCOUNTER — TRANSFERRED RECORDS (OUTPATIENT)
Dept: HEALTH INFORMATION MANAGEMENT | Facility: CLINIC | Age: 43
End: 2023-09-21
Payer: COMMERCIAL

## 2023-10-05 ENCOUNTER — OFFICE VISIT (OUTPATIENT)
Dept: INTERNAL MEDICINE | Facility: CLINIC | Age: 43
End: 2023-10-05
Payer: OTHER MISCELLANEOUS

## 2023-10-05 VITALS
OXYGEN SATURATION: 97 % | WEIGHT: 256 LBS | HEIGHT: 72 IN | SYSTOLIC BLOOD PRESSURE: 126 MMHG | TEMPERATURE: 98.1 F | RESPIRATION RATE: 16 BRPM | HEART RATE: 93 BPM | DIASTOLIC BLOOD PRESSURE: 88 MMHG | BODY MASS INDEX: 34.67 KG/M2

## 2023-10-05 DIAGNOSIS — S46.011D TRAUMATIC COMPLETE TEAR OF RIGHT ROTATOR CUFF, SUBSEQUENT ENCOUNTER: ICD-10-CM

## 2023-10-05 DIAGNOSIS — E66.09 CLASS 1 OBESITY DUE TO EXCESS CALORIES WITH SERIOUS COMORBIDITY AND BODY MASS INDEX (BMI) OF 34.0 TO 34.9 IN ADULT: ICD-10-CM

## 2023-10-05 DIAGNOSIS — Z01.818 PREOP GENERAL PHYSICAL EXAM: Primary | ICD-10-CM

## 2023-10-05 DIAGNOSIS — E66.811 CLASS 1 OBESITY DUE TO EXCESS CALORIES WITH SERIOUS COMORBIDITY AND BODY MASS INDEX (BMI) OF 34.0 TO 34.9 IN ADULT: ICD-10-CM

## 2023-10-05 DIAGNOSIS — G47.33 OSA (OBSTRUCTIVE SLEEP APNEA): ICD-10-CM

## 2023-10-05 PROBLEM — E66.812 CLASS 2 SEVERE OBESITY DUE TO EXCESS CALORIES WITH SERIOUS COMORBIDITY AND BODY MASS INDEX (BMI) OF 35.0 TO 35.9 IN ADULT (H): Status: RESOLVED | Noted: 2022-04-15 | Resolved: 2023-10-05

## 2023-10-05 PROBLEM — E66.01 CLASS 2 SEVERE OBESITY DUE TO EXCESS CALORIES WITH SERIOUS COMORBIDITY AND BODY MASS INDEX (BMI) OF 35.0 TO 35.9 IN ADULT (H): Status: RESOLVED | Noted: 2022-04-15 | Resolved: 2023-10-05

## 2023-10-05 PROBLEM — S46.011A TRAUMATIC COMPLETE TEAR OF RIGHT ROTATOR CUFF: Status: ACTIVE | Noted: 2023-10-05

## 2023-10-05 PROBLEM — R03.0 BLOOD PRESSURE ELEVATED WITHOUT HISTORY OF HTN: Status: RESOLVED | Noted: 2022-04-15 | Resolved: 2023-10-05

## 2023-10-05 LAB
ERYTHROCYTE [DISTWIDTH] IN BLOOD BY AUTOMATED COUNT: 12.2 % (ref 10–15)
HCT VFR BLD AUTO: 40.9 % (ref 40–53)
HGB BLD-MCNC: 14.2 G/DL (ref 13.3–17.7)
MCH RBC QN AUTO: 30 PG (ref 26.5–33)
MCHC RBC AUTO-ENTMCNC: 34.7 G/DL (ref 31.5–36.5)
MCV RBC AUTO: 87 FL (ref 78–100)
PLATELET # BLD AUTO: 310 10E3/UL (ref 150–450)
RBC # BLD AUTO: 4.73 10E6/UL (ref 4.4–5.9)
WBC # BLD AUTO: 8.4 10E3/UL (ref 4–11)

## 2023-10-05 PROCEDURE — 85027 COMPLETE CBC AUTOMATED: CPT | Performed by: INTERNAL MEDICINE

## 2023-10-05 PROCEDURE — 36415 COLL VENOUS BLD VENIPUNCTURE: CPT | Performed by: INTERNAL MEDICINE

## 2023-10-05 PROCEDURE — 99214 OFFICE O/P EST MOD 30 MIN: CPT | Performed by: INTERNAL MEDICINE

## 2023-10-05 RX ORDER — COVID-19 ANTIGEN TEST
440 KIT MISCELLANEOUS
COMMUNITY
End: 2023-11-08

## 2023-10-05 RX ORDER — PHENTERMINE HYDROCHLORIDE 37.5 MG/1
37.5 CAPSULE ORAL EVERY MORNING
Start: 2023-10-05 | End: 2023-11-08

## 2023-10-05 RX ORDER — TOPIRAMATE 25 MG/1
50 TABLET, FILM COATED ORAL DAILY
Start: 2023-10-05 | End: 2023-11-08

## 2023-10-05 NOTE — PATIENT INSTRUCTIONS
Preparing for Your Surgery  Getting started  A nurse will call you to review your health history and instructions. They will give you an arrival time based on your scheduled surgery time. Please be ready to share:  Your doctor's clinic name and phone number  Your medical, surgical, and anesthesia history  A list of allergies and sensitivities  A list of medicines, including herbal treatments and over-the-counter drugs  Whether the patient has a legal guardian (ask how to send us the papers in advance)  Please tell us if you're pregnant--or if there's any chance you might be pregnant. Some surgeries may injure a fetus (unborn baby), so they require a pregnancy test. Surgeries that are safe for a fetus don't always need a test, and you can choose whether to have one.   If you have a child who's having surgery, please ask for a copy of Preparing for Your Child's Surgery.    Preparing for surgery  Within 10 to 30 days of surgery: Have a pre-op exam (sometimes called an H&P, or History and Physical). This can be done at a clinic or pre-operative center.  If you're having a , you may not need this exam. Talk to your care team.  At your pre-op exam, talk to your care team about all medicines you take. If you need to stop any medicines before surgery, ask when to start taking them again.  We do this for your safety. Many medicines can make you bleed too much during surgery. Some change how well surgery (anesthesia) drugs work.  Call your insurance company to let them know you're having surgery. (If you don't have insurance, call [phone].)  Call your clinic if there's any change in your health. This includes signs of a cold or flu (sore throat, runny nose, cough, rash, fever). It also includes a scrape or scratch near the surgery site.  If you have questions on the day of surgery, call your hospital or surgery center.  Eating and drinking guidelines  For your safety: Unless your surgeon tells you otherwise,  follow the guidelines below.  Eat and drink as usual until 8 hours before you arrive for surgery. After that, no food or milk.  Drink clear liquids until 2 hours before you arrive. These are liquids you can see through, like water, Gatorade, and Propel Water. They also include plain black coffee and tea (no cream or milk), candy, and breath mints. You can spit out gum when you arrive.  If you drink alcohol: Stop drinking it the night before surgery.  If your care team tells you to take medicine on the morning of surgery, it's okay to take it with a sip of water.  Preventing infection  Shower or bathe the night before and morning of your surgery. Follow the instructions your clinic gave you. (If no instructions, use regular soap.)  Don't shave or clip hair near your surgery site. We'll remove the hair if needed.  Don't smoke or vape the morning of surgery. You may chew nicotine gum up to 2 hours before surgery. A nicotine patch is okay.  Note: Some surgeries require you to completely quit smoking and nicotine. Check with your surgeon.  Your care team will make every effort to keep you safe from infection. We will:  Clean our hands often with soap and water (or an alcohol-based hand rub).  Clean the skin at your surgery site with a special soap that kills germs.  Give you a special gown to keep you warm. (Cold raises the risk of infection.)  Wear special hair covers, masks, gowns and gloves during surgery.  Give antibiotic medicine, if prescribed. Not all surgeries need antibiotics.  What to bring on the day of surgery  Photo ID and insurance card  Copy of your health care directive, if you have one  Glasses and hearing aids (bring cases)  You can't wear contacts during surgery  Inhaler and eye drops, if you use them (tell us about these when you arrive)  CPAP machine or breathing device, if you use them  A few personal items, if spending the night  If you have . . .  A pacemaker, ICD (cardiac defibrillator) or other  implant: Bring the ID card.  An implanted stimulator: Bring the remote control.  A legal guardian: Bring a copy of the certified (court-stamped) guardianship papers.  Please remove any jewelry, including body piercings. Leave jewelry and other valuables at home.  If you're going home the day of surgery  You must have a responsible adult drive you home. They should stay with you overnight as well.  If you don't have someone to stay with you, and you aren't safe to go home alone, we may keep you overnight. Insurance often won't pay for this.  After surgery  If it's hard to control your pain or you need more pain medicine, please call your surgeon's office.  Questions?   If you have any questions for your care team, list them here: _________________________________________________________________________________________________________________________________________________________________________ ____________________________________ ____________________________________ ____________________________________  For informational purposes only. Not to replace the advice of your health care provider. Copyright   2003, 2019 [employer]. All rights reserved. Clinically reviewed by Nehal Jackson MD. [employer] 705127 - REV 12/22.    How to Take Your Medication Before Surgery    Avoid aspirin and over-the-counter NSAIDs including ibuprofen, Motrin, Advil and Aleve during the week prior to surgery.    You may take over-the-counter acetaminophen/Tylenol during the week prior to surgery if you need something for pain    Do not take your usual doses of topiramate or phentermine on the morning of surgery

## 2023-10-05 NOTE — PROGRESS NOTES
[facility]  [address] [place] [id number]  Phone: [phone]  Fax: [phone]  Primary Provider: Earnest Hein  Pre-op Performing Provider: EARNEST HEIN      PREOPERATIVE EVALUATION:  Today's date: 10/5/2023    Dean is a 43 year old male who presents for a preoperative evaluation.      10/5/2023     3:03 PM   Additional Questions   Roomed by        Surgical Information:  Surgery/Procedure: right rotator cuff repair  Surgery Location: [facility]  Surgeon: Dr. John  Surgery Date: 10/12/23  Time of Surgery:   Where patient plans to recover: At home with family  Fax number for surgical facility: [phone]    Assessment & Plan     The proposed surgical procedure is considered INTERMEDIATE risk.    Preop general physical exam  43-year-old male here for preop clearance prior to upcoming surgery for torn right rotator cuff.  He has tolerated previous surgery and anesthesia without any complications.  Overall risk is low and he may proceed with surgery and anesthesia as planned.  He will avoid aspirin and NSAIDs during the week prior to surgery.  - CBC with platelets; Future    Traumatic complete tear of right rotator cuff, subsequent encounter  Torn right rotator cuff with plans for surgery    YFN (obstructive sleep apnea)  Diagnosed with mild obstructive sleep apnea but not currently using CPAP.  Working at weight loss.    Class 1 obesity due to excess calories with serious comorbidity and body mass index (BMI) of 34.0 to 34.9 in adult  Using topiramate and phentermine to help with weight loss.  BMI is currently 34.7.  He will not take these medications on the day of surgery.  - topiramate (TOPAMAX) 25 MG tablet; Take 2 tablets (50 mg) by mouth daily  - phentermine (ADIPEX-P) 37.5 MG capsule; Take 1 capsule (37.5 mg) by mouth every morning               Antiplatelet or Anticoagulation Medication Instructions:   - Patient is on no antiplatelet or  anticoagulation medications.    Additional Medication Instructions:  Avoid aspirin and over-the-counter NSAIDs including ibuprofen, Motrin, Advil and Aleve during the week prior to surgery.    You may take over-the-counter acetaminophen/Tylenol during the week prior to surgery if you need something for pain    Do not take your usual doses of topiramate or phentermine on the morning of surgery    RECOMMENDATION:  APPROVAL GIVEN to proceed with proposed procedure, without further diagnostic evaluation.            Subjective       HPI related to upcoming procedure: 43-year-old male here for preop clearance prior to right rotator cuff repair.  See assessment and plan for details.        10/5/2023     5:51 AM   Preop Questions   1. Have you ever had a heart attack or stroke? No   2. Have you ever had surgery on your heart or blood vessels, such as a stent placement, a coronary artery bypass, or surgery on an artery in your head, neck, heart, or legs? No   3. Do you have chest pain with activity? No   4. Do you have a history of  heart failure? No   5. Do you currently have a cold, bronchitis or symptoms of other infection? No   6. Do you have a cough, shortness of breath, or wheezing? No   7. Do you or anyone in your family have previous history of blood clots? No   8. Do you or does anyone in your family have a serious bleeding problem such as prolonged bleeding following surgeries or cuts? No   9. Have you ever had problems with anemia or been told to take iron pills? No   10. Have you had any abnormal blood loss such as black, tarry or bloody stools? No   11. Have you ever had a blood transfusion? No   12. Are you willing to have a blood transfusion if it is medically needed before, during, or after your surgery? Yes   13. Have you or any of your relatives ever had problems with anesthesia? No   14. Do you have sleep apnea, excessive snoring or daytime drowsiness? YES -diagnosed with mild sleep apnea worse in supine  position   14a. Do you have a CPAP machine? No   15. Do you have any artifical heart valves or other implanted medical devices like a pacemaker, defibrillator, or continuous glucose monitor? No   16. Do you have artificial joints? No   17. Are you allergic to latex? No       Health Care Directive:  Patient does not have a Health Care Directive or Living Will:     Preoperative Review of :   reviewed - controlled substances reflected in medication list.          Review of Systems  12 point review of systems is negative other than what is discussed in the assessment and plan    Patient Active Problem List    Diagnosis Date Noted    Traumatic complete tear of right rotator cuff 10/05/2023     Priority: Medium    Class 1 obesity due to excess calories with serious comorbidity and body mass index (BMI) of 34.0 to 34.9 in adult 10/05/2023     Priority: Medium    Metabolic syndrome X 02/24/2023     Priority: Medium    YFN (obstructive sleep apnea) 04/15/2022     Priority: Medium    Chronic pain of both knees 04/15/2022     Priority: Medium      Past Medical History:   Diagnosis Date    Acute cervical radiculopathy 11/23/2015    Anemia     Blood pressure elevated without history of HTN 04/15/2022    Chronic elbow pain, right 01/02/2020    Chronic headaches 12/01/2017    Chronic pain of both knees 04/15/2022    Chronic pain of right knee 01/07/2021    Chronic right shoulder pain 11/23/2015    Class 1 obesity due to excess calories with serious comorbidity and body mass index (BMI) of 34.0 to 34.9 in adult 10/05/2023    Class 2 obesity in adult 04/15/2022    Community acquired pneumonia     2018    Elevated blood pressure     Hand pain, right 10/03/2017    Tendinitis versus carpal tunnel    Hypertension     Left ankle injury 2010    High-grade tear anterior talofibular ligament    Left foot pain 11/23/2015    Obesity (BMI 30.0-34.9) 12/01/2017    Plantar fasciitis, bilateral 11/23/2015    Shoulder separation     Snoring  12/01/2017    Suspected sleep apnea 04/15/2022    Traumatic complete tear of right rotator cuff 10/05/2023     Past Surgical History:   Procedure Laterality Date    COLONOSCOPY      WISDOM TOOTH EXTRACTION       Current Outpatient Medications   Medication Sig Dispense Refill    naproxen sodium 220 MG capsule Take 440 mg by mouth daily before breakfast      phentermine (ADIPEX-P) 37.5 MG capsule Take 1 capsule (37.5 mg) by mouth every morning      topiramate (TOPAMAX) 25 MG tablet Take 2 tablets (50 mg) by mouth daily         No Known Allergies     Social History     Tobacco Use    Smoking status: Never     Passive exposure: Never    Smokeless tobacco: Never   Substance Use Topics    Alcohol use: Yes     Comment: Alcoholic Drinks/day: 1-2/ month     Family History   Problem Relation Age of Onset    Hypertension Mother     Hypertension Father     Coronary Artery Disease Maternal Grandmother     Coronary Artery Disease Maternal Grandfather     Heart Failure Maternal Grandfather     Coronary Artery Disease Paternal Grandmother         early 60s    Chronic Obstructive Pulmonary Disease Paternal Grandfather     Colon Cancer No family hx of     Prostate Cancer No family hx of     Thyroid Cancer No family hx of      History   Drug Use Unknown         Objective     /88 (BP Location: Right arm, Patient Position: Sitting, Cuff Size: Adult Regular)   Pulse 93   Temp 98.1  F (36.7  C) (Oral)   Resp 16   Ht 1.829 m (6')   Wt 116.1 kg (256 lb)   SpO2 97%   BMI 34.72 kg/m      Physical Exam  GENERAL APPEARANCE: healthy, alert and no distress  HENT: Normal  RESP: lungs clear to auscultation - no rales, rhonchi or wheezes  CV: regular rate and rhythm, normal S1 S2, no S3 or S4 and no murmur, click or rub.  No carotid bruits  ABDOMEN: soft, nontender, no HSM or masses and bowel sounds normal  NEURO: Normal strength and tone, sensory exam grossly normal, mentation intact and speech  normal        Diagnostics:  Labs-  Hemoglobin 14.2 platelet 310     No EKG required, no history of coronary heart disease, significant arrhythmia, peripheral arterial disease or other structural heart disease.    Revised Cardiac Risk Index (RCRI):  The patient has the following serious cardiovascular risks for perioperative complications:   - No serious cardiac risks = 0 points     RCRI Interpretation: 0 points: Class I (very low risk - 0.4% complication rate)         Signed Electronically by: Earnest Hein MD  Copy of this evaluation report is provided to requesting physician.

## 2023-10-26 ENCOUNTER — TRANSFERRED RECORDS (OUTPATIENT)
Dept: HEALTH INFORMATION MANAGEMENT | Facility: CLINIC | Age: 43
End: 2023-10-26
Payer: COMMERCIAL

## 2023-11-03 ENCOUNTER — TELEPHONE (OUTPATIENT)
Dept: SLEEP MEDICINE | Facility: CLINIC | Age: 43
End: 2023-11-03
Payer: COMMERCIAL

## 2023-11-03 DIAGNOSIS — G47.33 OSA (OBSTRUCTIVE SLEEP APNEA): Primary | ICD-10-CM

## 2023-11-03 NOTE — TELEPHONE ENCOUNTER
Reason for Call:  Other prescription    Detailed comments: patients spouse called and states patient needs [facility]     Please contact patient.  Thank you.    Phone Number Patient can be reached at: Home number on file [phone] (home)    Best Time: any    Can we leave a detailed message on this number? YES    Call taken on 11/3/2023 at 2:23 PM by Deisy Luciano

## 2023-11-03 NOTE — TELEPHONE ENCOUNTER
Patient seen 11/22/22. Would like to proceed with CPAP. No referral for sleep dentistry sent out.

## 2023-11-06 PROBLEM — G47.33 OSA (OBSTRUCTIVE SLEEP APNEA): Chronic | Status: ACTIVE | Noted: 2022-04-15

## 2023-11-08 ENCOUNTER — HOSPITAL ENCOUNTER (OUTPATIENT)
Facility: CLINIC | Age: 43
Setting detail: OBSERVATION
Discharge: HOME | End: 2023-11-09
Attending: EMERGENCY MEDICINE | Admitting: STUDENT IN AN ORGANIZED HEALTH CARE EDUCATION/TRAINING PROGRAM
Payer: COMMERCIAL

## 2023-11-08 ENCOUNTER — APPOINTMENT (OUTPATIENT)
Dept: CT IMAGING | Facility: CLINIC | Age: 43
End: 2023-11-08
Attending: EMERGENCY MEDICINE
Payer: COMMERCIAL

## 2023-11-08 DIAGNOSIS — K35.30 ACUTE APPENDICITIS WITH LOCALIZED PERITONITIS, WITHOUT PERFORATION, ABSCESS, OR GANGRENE: ICD-10-CM

## 2023-11-08 LAB
ALBUMIN SERPL BCG-MCNC: 4.9 G/DL (ref 3.5–5.2)
ALBUMIN UR-MCNC: 10 MG/DL
ALP SERPL-CCNC: 58 U/L (ref 40–129)
ALT SERPL W P-5'-P-CCNC: 20 U/L (ref 0–70)
ANION GAP SERPL CALCULATED.3IONS-SCNC: 12 MMOL/L (ref 7–15)
APPEARANCE UR: CLEAR
AST SERPL W P-5'-P-CCNC: 19 U/L (ref 0–45)
BASOPHILS # BLD AUTO: 0.1 10E3/UL (ref 0–0.2)
BASOPHILS NFR BLD AUTO: 0 %
BILIRUB SERPL-MCNC: 0.4 MG/DL
BILIRUB UR QL STRIP: NEGATIVE
BUN SERPL-MCNC: 8.9 MG/DL (ref 6–20)
CALCIUM SERPL-MCNC: 9.9 MG/DL (ref 8.6–10)
CHLORIDE SERPL-SCNC: 99 MMOL/L (ref 98–107)
COLOR UR AUTO: COLORLESS
CREAT SERPL-MCNC: 0.77 MG/DL (ref 0.67–1.17)
DEPRECATED HCO3 PLAS-SCNC: 26 MMOL/L (ref 22–29)
EGFRCR SERPLBLD CKD-EPI 2021: >90 ML/MIN/1.73M2
EOSINOPHIL # BLD AUTO: 0.2 10E3/UL (ref 0–0.7)
EOSINOPHIL NFR BLD AUTO: 1 %
ERYTHROCYTE [DISTWIDTH] IN BLOOD BY AUTOMATED COUNT: 12.9 % (ref 10–15)
GLUCOSE SERPL-MCNC: 113 MG/DL (ref 70–99)
GLUCOSE UR STRIP-MCNC: NEGATIVE MG/DL
HCT VFR BLD AUTO: 42.1 % (ref 40–53)
HGB BLD-MCNC: 14.4 G/DL (ref 13.3–17.7)
HGB UR QL STRIP: NEGATIVE
IMM GRANULOCYTES # BLD: 0.1 10E3/UL
IMM GRANULOCYTES NFR BLD: 0 %
KETONES UR STRIP-MCNC: NEGATIVE MG/DL
LEUKOCYTE ESTERASE UR QL STRIP: NEGATIVE
LIPASE SERPL-CCNC: 26 U/L (ref 13–60)
LYMPHOCYTES # BLD AUTO: 1.7 10E3/UL (ref 0.8–5.3)
LYMPHOCYTES NFR BLD AUTO: 12 %
MCH RBC QN AUTO: 29.3 PG (ref 26.5–33)
MCHC RBC AUTO-ENTMCNC: 34.2 G/DL (ref 31.5–36.5)
MCV RBC AUTO: 86 FL (ref 78–100)
MONOCYTES # BLD AUTO: 0.9 10E3/UL (ref 0–1.3)
MONOCYTES NFR BLD AUTO: 6 %
NEUTROPHILS # BLD AUTO: 11.1 10E3/UL (ref 1.6–8.3)
NEUTROPHILS NFR BLD AUTO: 81 %
NITRATE UR QL: NEGATIVE
NRBC # BLD AUTO: 0 10E3/UL
NRBC BLD AUTO-RTO: 0 /100
PH UR STRIP: 7.5 [PH] (ref 5–7)
PLATELET # BLD AUTO: 287 10E3/UL (ref 150–450)
POTASSIUM SERPL-SCNC: 3.8 MMOL/L (ref 3.4–5.3)
PROT SERPL-MCNC: 7.6 G/DL (ref 6.4–8.3)
RBC # BLD AUTO: 4.91 10E6/UL (ref 4.4–5.9)
RBC URINE: 1 /HPF
SODIUM SERPL-SCNC: 137 MMOL/L (ref 135–145)
SP GR UR STRIP: <1.005 (ref 1–1.03)
UROBILINOGEN UR STRIP-MCNC: <2 MG/DL
WBC # BLD AUTO: 14 10E3/UL (ref 4–11)
WBC URINE: <1 /HPF

## 2023-11-08 PROCEDURE — 96374 THER/PROPH/DIAG INJ IV PUSH: CPT | Mod: 59

## 2023-11-08 PROCEDURE — G0378 HOSPITAL OBSERVATION PER HR: HCPCS

## 2023-11-08 PROCEDURE — 85025 COMPLETE CBC W/AUTO DIFF WBC: CPT | Performed by: EMERGENCY MEDICINE

## 2023-11-08 PROCEDURE — 96375 TX/PRO/DX INJ NEW DRUG ADDON: CPT

## 2023-11-08 PROCEDURE — 83690 ASSAY OF LIPASE: CPT | Performed by: EMERGENCY MEDICINE

## 2023-11-08 PROCEDURE — 250N000011 HC RX IP 250 OP 636: Performed by: EMERGENCY MEDICINE

## 2023-11-08 PROCEDURE — 74177 CT ABD & PELVIS W/CONTRAST: CPT

## 2023-11-08 PROCEDURE — 250N000011 HC RX IP 250 OP 636: Mod: JZ | Performed by: EMERGENCY MEDICINE

## 2023-11-08 PROCEDURE — 36415 COLL VENOUS BLD VENIPUNCTURE: CPT | Performed by: EMERGENCY MEDICINE

## 2023-11-08 PROCEDURE — 81001 URINALYSIS AUTO W/SCOPE: CPT | Performed by: EMERGENCY MEDICINE

## 2023-11-08 PROCEDURE — 84155 ASSAY OF PROTEIN SERUM: CPT | Performed by: EMERGENCY MEDICINE

## 2023-11-08 PROCEDURE — 80053 COMPREHEN METABOLIC PANEL: CPT | Performed by: EMERGENCY MEDICINE

## 2023-11-08 RX ORDER — SODIUM CHLORIDE 9 MG/ML
INJECTION, SOLUTION INTRAVENOUS CONTINUOUS
Status: DISCONTINUED | OUTPATIENT
Start: 2023-11-08 | End: 2023-11-09 | Stop reason: HOSPADM

## 2023-11-08 RX ORDER — ONDANSETRON 4 MG/1
4 TABLET, ORALLY DISINTEGRATING ORAL EVERY 6 HOURS PRN
Status: DISCONTINUED | OUTPATIENT
Start: 2023-11-08 | End: 2023-11-09 | Stop reason: HOSPADM

## 2023-11-08 RX ORDER — HYDROMORPHONE HYDROCHLORIDE 1 MG/ML
0.5 INJECTION, SOLUTION INTRAMUSCULAR; INTRAVENOUS; SUBCUTANEOUS ONCE
Status: COMPLETED | OUTPATIENT
Start: 2023-11-08 | End: 2023-11-08

## 2023-11-08 RX ORDER — ONDANSETRON 2 MG/ML
4 INJECTION INTRAMUSCULAR; INTRAVENOUS ONCE
Status: COMPLETED | OUTPATIENT
Start: 2023-11-08 | End: 2023-11-08

## 2023-11-08 RX ORDER — HYDRALAZINE HYDROCHLORIDE 20 MG/ML
10 INJECTION INTRAMUSCULAR; INTRAVENOUS EVERY 6 HOURS PRN
Status: DISCONTINUED | OUTPATIENT
Start: 2023-11-08 | End: 2023-11-09 | Stop reason: HOSPADM

## 2023-11-08 RX ORDER — HYDROMORPHONE HYDROCHLORIDE 1 MG/ML
0.2 INJECTION, SOLUTION INTRAMUSCULAR; INTRAVENOUS; SUBCUTANEOUS
Status: DISCONTINUED | OUTPATIENT
Start: 2023-11-08 | End: 2023-11-08

## 2023-11-08 RX ORDER — ONDANSETRON 2 MG/ML
4 INJECTION INTRAMUSCULAR; INTRAVENOUS EVERY 6 HOURS PRN
Status: DISCONTINUED | OUTPATIENT
Start: 2023-11-08 | End: 2023-11-09 | Stop reason: HOSPADM

## 2023-11-08 RX ORDER — PIPERACILLIN SODIUM, TAZOBACTAM SODIUM 3; .375 G/15ML; G/15ML
3.38 INJECTION, POWDER, LYOPHILIZED, FOR SOLUTION INTRAVENOUS ONCE
Status: COMPLETED | OUTPATIENT
Start: 2023-11-08 | End: 2023-11-08

## 2023-11-08 RX ORDER — IOPAMIDOL 755 MG/ML
90 INJECTION, SOLUTION INTRAVASCULAR ONCE
Status: COMPLETED | OUTPATIENT
Start: 2023-11-08 | End: 2023-11-08

## 2023-11-08 RX ORDER — KETOROLAC TROMETHAMINE 15 MG/ML
15 INJECTION, SOLUTION INTRAMUSCULAR; INTRAVENOUS ONCE
Status: COMPLETED | OUTPATIENT
Start: 2023-11-08 | End: 2023-11-08

## 2023-11-08 RX ORDER — HYDROMORPHONE HYDROCHLORIDE 1 MG/ML
0.5 INJECTION, SOLUTION INTRAMUSCULAR; INTRAVENOUS; SUBCUTANEOUS
Status: DISCONTINUED | OUTPATIENT
Start: 2023-11-08 | End: 2023-11-09 | Stop reason: HOSPADM

## 2023-11-08 RX ADMIN — PIPERACILLIN AND TAZOBACTAM 3.38 G: 3; .375 INJECTION, POWDER, LYOPHILIZED, FOR SOLUTION INTRAVENOUS at 22:56

## 2023-11-08 RX ADMIN — KETOROLAC TROMETHAMINE 15 MG: 15 INJECTION, SOLUTION INTRAMUSCULAR; INTRAVENOUS at 19:55

## 2023-11-08 RX ADMIN — ONDANSETRON 4 MG: 2 INJECTION INTRAMUSCULAR; INTRAVENOUS at 19:46

## 2023-11-08 RX ADMIN — HYDROMORPHONE HYDROCHLORIDE 0.5 MG: 1 INJECTION, SOLUTION INTRAMUSCULAR; INTRAVENOUS; SUBCUTANEOUS at 22:01

## 2023-11-08 RX ADMIN — FAMOTIDINE 20 MG: 10 INJECTION INTRAVENOUS at 19:56

## 2023-11-08 RX ADMIN — IOPAMIDOL 90 ML: 755 INJECTION, SOLUTION INTRAVENOUS at 20:56

## 2023-11-08 RX ADMIN — PROCHLORPERAZINE EDISYLATE 10 MG: 5 INJECTION INTRAMUSCULAR; INTRAVENOUS at 21:30

## 2023-11-08 ASSESSMENT — ACTIVITIES OF DAILY LIVING (ADL)
ADLS_ACUITY_SCORE: 35
ADLS_ACUITY_SCORE: 35

## 2023-11-09 ENCOUNTER — HOSPITAL ENCOUNTER (OUTPATIENT)
Facility: CLINIC | Age: 43
End: 2023-11-09
Attending: SURGERY | Admitting: SURGERY
Payer: COMMERCIAL

## 2023-11-09 ENCOUNTER — ANESTHESIA EVENT (OUTPATIENT)
Dept: SURGERY | Facility: CLINIC | Age: 43
End: 2023-11-09
Payer: COMMERCIAL

## 2023-11-09 ENCOUNTER — ANESTHESIA (OUTPATIENT)
Dept: SURGERY | Facility: CLINIC | Age: 43
End: 2023-11-09
Payer: COMMERCIAL

## 2023-11-09 VITALS
BODY MASS INDEX: 33.13 KG/M2 | SYSTOLIC BLOOD PRESSURE: 154 MMHG | TEMPERATURE: 97.6 F | HEART RATE: 108 BPM | OXYGEN SATURATION: 95 % | RESPIRATION RATE: 16 BRPM | HEIGHT: 73 IN | WEIGHT: 250 LBS | DIASTOLIC BLOOD PRESSURE: 81 MMHG

## 2023-11-09 PROCEDURE — 99285 EMERGENCY DEPT VISIT HI MDM: CPT | Mod: 25

## 2023-11-09 PROCEDURE — 370N000017 HC ANESTHESIA TECHNICAL FEE, PER MIN: Performed by: SURGERY

## 2023-11-09 PROCEDURE — 999N000141 HC STATISTIC PRE-PROCEDURE NURSING ASSESSMENT: Performed by: SURGERY

## 2023-11-09 PROCEDURE — 710N000010 HC RECOVERY PHASE 1, LEVEL 2, PER MIN: Performed by: SURGERY

## 2023-11-09 PROCEDURE — 258N000003 HC RX IP 258 OP 636

## 2023-11-09 PROCEDURE — 88304 TISSUE EXAM BY PATHOLOGIST: CPT | Mod: TC | Performed by: SURGERY

## 2023-11-09 PROCEDURE — 96375 TX/PRO/DX INJ NEW DRUG ADDON: CPT | Mod: XU

## 2023-11-09 PROCEDURE — 710N000012 HC RECOVERY PHASE 2, PER MINUTE: Performed by: SURGERY

## 2023-11-09 PROCEDURE — 250N000011 HC RX IP 250 OP 636: Performed by: SURGERY

## 2023-11-09 PROCEDURE — 250N000013 HC RX MED GY IP 250 OP 250 PS 637: Performed by: SURGERY

## 2023-11-09 PROCEDURE — 272N000001 HC OR GENERAL SUPPLY STERILE: Performed by: SURGERY

## 2023-11-09 PROCEDURE — 44970 LAPAROSCOPY APPENDECTOMY: CPT | Performed by: SURGERY

## 2023-11-09 PROCEDURE — 250N000011 HC RX IP 250 OP 636: Mod: JZ | Performed by: NURSE ANESTHETIST, CERTIFIED REGISTERED

## 2023-11-09 PROCEDURE — 96376 TX/PRO/DX INJ SAME DRUG ADON: CPT

## 2023-11-09 PROCEDURE — 99223 1ST HOSP IP/OBS HIGH 75: CPT | Mod: GC

## 2023-11-09 PROCEDURE — 88304 TISSUE EXAM BY PATHOLOGIST: CPT | Mod: 26 | Performed by: PATHOLOGY

## 2023-11-09 PROCEDURE — G0378 HOSPITAL OBSERVATION PER HR: HCPCS

## 2023-11-09 PROCEDURE — 250N000025 HC SEVOFLURANE, PER MIN: Performed by: SURGERY

## 2023-11-09 PROCEDURE — 99203 OFFICE O/P NEW LOW 30 MIN: CPT | Mod: 57 | Performed by: SURGERY

## 2023-11-09 PROCEDURE — 250N000009 HC RX 250: Performed by: NURSE ANESTHETIST, CERTIFIED REGISTERED

## 2023-11-09 PROCEDURE — 250N000011 HC RX IP 250 OP 636: Mod: JZ

## 2023-11-09 PROCEDURE — 360N000076 HC SURGERY LEVEL 3, PER MIN: Performed by: SURGERY

## 2023-11-09 PROCEDURE — 258N000003 HC RX IP 258 OP 636: Performed by: STUDENT IN AN ORGANIZED HEALTH CARE EDUCATION/TRAINING PROGRAM

## 2023-11-09 RX ORDER — SODIUM CHLORIDE, SODIUM LACTATE, POTASSIUM CHLORIDE, CALCIUM CHLORIDE 600; 310; 30; 20 MG/100ML; MG/100ML; MG/100ML; MG/100ML
INJECTION, SOLUTION INTRAVENOUS CONTINUOUS
Status: DISCONTINUED | OUTPATIENT
Start: 2023-11-09 | End: 2023-11-09 | Stop reason: HOSPADM

## 2023-11-09 RX ORDER — FENTANYL CITRATE 50 UG/ML
50 INJECTION, SOLUTION INTRAMUSCULAR; INTRAVENOUS EVERY 5 MIN PRN
Status: DISCONTINUED | OUTPATIENT
Start: 2023-11-09 | End: 2023-11-09 | Stop reason: HOSPADM

## 2023-11-09 RX ORDER — HYDROMORPHONE HCL IN WATER/PF 6 MG/30 ML
0.2 PATIENT CONTROLLED ANALGESIA SYRINGE INTRAVENOUS EVERY 5 MIN PRN
Status: DISCONTINUED | OUTPATIENT
Start: 2023-11-09 | End: 2023-11-09 | Stop reason: HOSPADM

## 2023-11-09 RX ORDER — ONDANSETRON 2 MG/ML
4 INJECTION INTRAMUSCULAR; INTRAVENOUS EVERY 30 MIN PRN
Status: DISCONTINUED | OUTPATIENT
Start: 2023-11-09 | End: 2023-11-09 | Stop reason: HOSPADM

## 2023-11-09 RX ORDER — ONDANSETRON 2 MG/ML
INJECTION INTRAMUSCULAR; INTRAVENOUS PRN
Status: DISCONTINUED | OUTPATIENT
Start: 2023-11-09 | End: 2023-11-09

## 2023-11-09 RX ORDER — ONDANSETRON 4 MG/1
4 TABLET, ORALLY DISINTEGRATING ORAL EVERY 30 MIN PRN
Status: DISCONTINUED | OUTPATIENT
Start: 2023-11-09 | End: 2023-11-09 | Stop reason: HOSPADM

## 2023-11-09 RX ORDER — CEFAZOLIN SODIUM/WATER 2 G/20 ML
2 SYRINGE (ML) INTRAVENOUS SEE ADMIN INSTRUCTIONS
Status: DISCONTINUED | OUTPATIENT
Start: 2023-11-09 | End: 2023-11-09 | Stop reason: HOSPADM

## 2023-11-09 RX ORDER — CEFAZOLIN SODIUM/WATER 2 G/20 ML
2 SYRINGE (ML) INTRAVENOUS
Status: COMPLETED | OUTPATIENT
Start: 2023-11-09 | End: 2023-11-09

## 2023-11-09 RX ORDER — OXYCODONE HYDROCHLORIDE 5 MG/1
5 TABLET ORAL
Status: COMPLETED | OUTPATIENT
Start: 2023-11-09 | End: 2023-11-09

## 2023-11-09 RX ORDER — KETAMINE HYDROCHLORIDE 10 MG/ML
INJECTION INTRAMUSCULAR; INTRAVENOUS PRN
Status: DISCONTINUED | OUTPATIENT
Start: 2023-11-09 | End: 2023-11-09

## 2023-11-09 RX ORDER — KETOROLAC TROMETHAMINE 30 MG/ML
INJECTION, SOLUTION INTRAMUSCULAR; INTRAVENOUS PRN
Status: DISCONTINUED | OUTPATIENT
Start: 2023-11-09 | End: 2023-11-09

## 2023-11-09 RX ORDER — FENTANYL CITRATE 50 UG/ML
25 INJECTION, SOLUTION INTRAMUSCULAR; INTRAVENOUS EVERY 5 MIN PRN
Status: DISCONTINUED | OUTPATIENT
Start: 2023-11-09 | End: 2023-11-09 | Stop reason: HOSPADM

## 2023-11-09 RX ORDER — LIDOCAINE HYDROCHLORIDE 10 MG/ML
INJECTION, SOLUTION INFILTRATION; PERINEURAL PRN
Status: DISCONTINUED | OUTPATIENT
Start: 2023-11-09 | End: 2023-11-09

## 2023-11-09 RX ORDER — LIDOCAINE 40 MG/G
CREAM TOPICAL
Status: DISCONTINUED | OUTPATIENT
Start: 2023-11-09 | End: 2023-11-09 | Stop reason: HOSPADM

## 2023-11-09 RX ORDER — DEXAMETHASONE SODIUM PHOSPHATE 10 MG/ML
INJECTION, SOLUTION INTRAMUSCULAR; INTRAVENOUS PRN
Status: DISCONTINUED | OUTPATIENT
Start: 2023-11-09 | End: 2023-11-09

## 2023-11-09 RX ORDER — BUPIVACAINE HYDROCHLORIDE 2.5 MG/ML
INJECTION, SOLUTION INFILTRATION; PERINEURAL PRN
Status: DISCONTINUED | OUTPATIENT
Start: 2023-11-09 | End: 2023-11-09 | Stop reason: HOSPADM

## 2023-11-09 RX ORDER — HYDROMORPHONE HCL IN WATER/PF 6 MG/30 ML
0.4 PATIENT CONTROLLED ANALGESIA SYRINGE INTRAVENOUS EVERY 5 MIN PRN
Status: DISCONTINUED | OUTPATIENT
Start: 2023-11-09 | End: 2023-11-09 | Stop reason: HOSPADM

## 2023-11-09 RX ORDER — FENTANYL CITRATE 50 UG/ML
INJECTION, SOLUTION INTRAMUSCULAR; INTRAVENOUS PRN
Status: DISCONTINUED | OUTPATIENT
Start: 2023-11-09 | End: 2023-11-09

## 2023-11-09 RX ORDER — ACETAMINOPHEN 325 MG/1
975 TABLET ORAL ONCE
Status: COMPLETED | OUTPATIENT
Start: 2023-11-09 | End: 2023-11-09

## 2023-11-09 RX ORDER — PROPOFOL 10 MG/ML
INJECTION, EMULSION INTRAVENOUS PRN
Status: DISCONTINUED | OUTPATIENT
Start: 2023-11-09 | End: 2023-11-09

## 2023-11-09 RX ORDER — OXYCODONE HYDROCHLORIDE 5 MG/1
5 TABLET ORAL
Status: DISCONTINUED | OUTPATIENT
Start: 2023-11-09 | End: 2023-11-09 | Stop reason: HOSPADM

## 2023-11-09 RX ORDER — OXYCODONE HYDROCHLORIDE 5 MG/1
5-10 TABLET ORAL EVERY 4 HOURS PRN
Qty: 6 TABLET | Refills: 0 | Status: SHIPPED | OUTPATIENT
Start: 2023-11-09 | End: 2024-01-22

## 2023-11-09 RX ORDER — LABETALOL HYDROCHLORIDE 5 MG/ML
10 INJECTION, SOLUTION INTRAVENOUS
Status: DISCONTINUED | OUTPATIENT
Start: 2023-11-09 | End: 2023-11-09 | Stop reason: HOSPADM

## 2023-11-09 RX ADMIN — SODIUM CHLORIDE, POTASSIUM CHLORIDE, SODIUM LACTATE AND CALCIUM CHLORIDE: 600; 310; 30; 20 INJECTION, SOLUTION INTRAVENOUS at 08:26

## 2023-11-09 RX ADMIN — LIDOCAINE HYDROCHLORIDE 5 ML: 10 INJECTION, SOLUTION INFILTRATION; PERINEURAL at 08:31

## 2023-11-09 RX ADMIN — HYDRALAZINE HYDROCHLORIDE 10 MG: 20 INJECTION, SOLUTION INTRAMUSCULAR; INTRAVENOUS at 02:43

## 2023-11-09 RX ADMIN — OXYCODONE HYDROCHLORIDE 5 MG: 5 TABLET ORAL at 11:15

## 2023-11-09 RX ADMIN — HYDROMORPHONE HYDROCHLORIDE 0.5 MG: 1 INJECTION, SOLUTION INTRAMUSCULAR; INTRAVENOUS; SUBCUTANEOUS at 09:10

## 2023-11-09 RX ADMIN — KETOROLAC TROMETHAMINE 30 MG: 30 INJECTION, SOLUTION INTRAMUSCULAR at 08:59

## 2023-11-09 RX ADMIN — ROCURONIUM BROMIDE 50 MG: 10 INJECTION, SOLUTION INTRAVENOUS at 08:31

## 2023-11-09 RX ADMIN — PROPOFOL 200 MG: 10 INJECTION, EMULSION INTRAVENOUS at 08:31

## 2023-11-09 RX ADMIN — HYDROMORPHONE HYDROCHLORIDE 0.5 MG: 1 INJECTION, SOLUTION INTRAMUSCULAR; INTRAVENOUS; SUBCUTANEOUS at 06:59

## 2023-11-09 RX ADMIN — ACETAMINOPHEN 975 MG: 325 TABLET ORAL at 08:20

## 2023-11-09 RX ADMIN — FENTANYL CITRATE 100 MCG: 50 INJECTION INTRAMUSCULAR; INTRAVENOUS at 08:27

## 2023-11-09 RX ADMIN — SODIUM CHLORIDE, POTASSIUM CHLORIDE, SODIUM LACTATE AND CALCIUM CHLORIDE: 600; 310; 30; 20 INJECTION, SOLUTION INTRAVENOUS at 09:23

## 2023-11-09 RX ADMIN — SUGAMMADEX 300 MG: 100 INJECTION, SOLUTION INTRAVENOUS at 08:59

## 2023-11-09 RX ADMIN — DEXAMETHASONE SODIUM PHOSPHATE 10 MG: 10 INJECTION, SOLUTION INTRAMUSCULAR; INTRAVENOUS at 08:48

## 2023-11-09 RX ADMIN — SODIUM CHLORIDE: 9 INJECTION, SOLUTION INTRAVENOUS at 02:12

## 2023-11-09 RX ADMIN — HYDROMORPHONE HYDROCHLORIDE 0.5 MG: 1 INJECTION, SOLUTION INTRAMUSCULAR; INTRAVENOUS; SUBCUTANEOUS at 09:01

## 2023-11-09 RX ADMIN — ONDANSETRON 4 MG: 2 INJECTION INTRAMUSCULAR; INTRAVENOUS at 08:59

## 2023-11-09 RX ADMIN — Medication 2 G: at 08:19

## 2023-11-09 RX ADMIN — HYDROMORPHONE HYDROCHLORIDE 0.5 MG: 1 INJECTION, SOLUTION INTRAMUSCULAR; INTRAVENOUS; SUBCUTANEOUS at 02:20

## 2023-11-09 RX ADMIN — KETAMINE HYDROCHLORIDE 50 MG: 10 INJECTION INTRAMUSCULAR; INTRAVENOUS at 08:40

## 2023-11-09 RX ADMIN — MIDAZOLAM 2 MG: 1 INJECTION INTRAMUSCULAR; INTRAVENOUS at 08:21

## 2023-11-09 ASSESSMENT — ACTIVITIES OF DAILY LIVING (ADL)
ADLS_ACUITY_SCORE: 35

## 2023-11-09 NOTE — OP NOTE
[facility]  Operative Note    Pre-operative diagnosis: Acute appendicitis with localized peritonitis, without perforation, abscess, or gangrene [K35.30]   Post-operative diagnosis acute appendicitis   Procedure: Procedure(s):  APPENDECTOMY, LAPAROSCOPIC   Surgeon: David Carey MD   Assistants(s): none   Anesthesia: General Anesthetic    Estimated blood loss: 20 mL                Drains: None   Specimens: Appendix       Findings: None.   Complications: None.           Description of procedure:  The patient was brought to the operating room where after induction of general anesthesia with endotracheal intubation he was positioned with the left arm tucked right arm out.  Was then prepped and draped in standard sterile fashion.  After a procedural pause we began by injecting one quarter percent Marcaine with epinephrine at the skin inferior the umbilicus.  This was then sharply incised.  A 5 mm optical trocar was then placed through this site, and abdomen insufflated.  2 additional ports were then placed on the left lower abdomen.  On initial survey of the abdominal cavity we noted an acutely inflamed appendix without perforation or abscess.        We began the operation by dissecting the appendix free from the surrounding tissues.  Once this was sufficiently clear and we were able to identify the base of the appendix a window was developed between the appendix and the appendiceal mesentery.  Once this was sufficiently enlarged a 45 mm white load endoscopic stapler was inserted.  The appendix was divided at its base against the cecum.  The mesentery was then divided with electrocautery.  This was then placed into an Endo Catch bag.  We then surveyed the pelvis for hemostasis and aspirated clear all spilled fluid.  A figure of 8 0 vicryl suture was then placed at the 12 mm port site for closure of the fascia at the end of the case with a laparoscopic suture passer.  The appendix within the  Endo Catch bag was then retrieved from the abdominal cavity.  Insufflation was released and the ports were removed.  The preplaced fascial tie was then tied down with excellent obliteration of the fascial defect.  The remainder of the local anesthetic was then injected in the skin and fascia surrounding the port sites and the skin was closed with interrupted 4-0 Vicryl subcuticular sutures.      David Carey MD, FACS  Office: [phone]  [facility]   General and Bariatric Surgery

## 2023-11-09 NOTE — DISCHARGE INSTRUCTIONS
You can take tylenol and Ibuprofen for pain.  You had tylenol 975 mg at 8:30 am you can take it again at 2:30 pm.

## 2023-11-09 NOTE — ANESTHESIA CARE TRANSFER NOTE
Patient: Peewee Walsh    Procedure: Procedure(s):  APPENDECTOMY, LAPAROSCOPIC       Diagnosis: Acute appendicitis with localized peritonitis, without perforation, abscess, or gangrene [K35.30]  Diagnosis Additional Information: No value filed.    Anesthesia Type:   General     Note:    Oropharynx: oropharynx clear of all foreign objects and spontaneously breathing  Level of Consciousness: drowsy  Oxygen Supplementation: face mask  Level of Supplemental Oxygen (L/min / FiO2): 8  Independent Airway: airway patency satisfactory and stable  Dentition: dentition unchanged  Vital Signs Stable: post-procedure vital signs reviewed and stable  Report to RN Given: handoff report given  Patient transferred to: PACU    Handoff Report: Identifed the Patient, Identified the Reponsible Provider, Reviewed the pertinent medical history, Discussed the surgical course, Reviewed Intra-OP anesthesia mangement and issues during anesthesia, Set expectations for post-procedure period and Allowed opportunity for questions and acknowledgement of understanding      Vitals:  Vitals Value Taken Time   /66 11/09/23 0910   Temp 37  C (98.6  F) 11/09/23 0908   Pulse 105 11/09/23 0909   Resp 8 11/09/23 0909   SpO2 100 % 11/09/23 0909   Vitals shown include unfiled device data.    Electronically Signed By: CATE BARBA CRNA  November 9, 2023  9:11 AM

## 2023-11-09 NOTE — PROGRESS NOTES
PRIMARY DIAGNOSIS: appendicitis, HTN   OUTPATIENT/OBSERVATION GOALS TO BE MET BEFORE DISCHARGE:  ADLs back to baseline: No    Activity and level of assistance: in bed overnight     Pain status: Improved but still requiring IV narcotics.    Return to near baseline physical activity: No     Discharge Planner Nurse      Please review provider order for any additional goals.   Nurse to notify provider when observation goals have been met and patient is ready for discharge.    Pt A&O x4, NPO since midnight. PRN IV Dilaudid 0.5mg q2 for R abdominal pain last @0659 helpful per pt. 0.9@125/hr. Pt sleeping in between cares. PRN IV hydralazine 10mg q6hr for SBP>180 last @0243 with result of SBP 160s.

## 2023-11-09 NOTE — ED TRIAGE NOTES
Pt presents to the ED with c/o of abd pain that started at 2 pm today. Pt is hypertensive in triage. Pt having nausea.      Triage Assessment (Adult)       Row Name 11/08/23 7184          Triage Assessment    Airway WDL WDL        Respiratory WDL    Respiratory WDL WDL        Cardiac WDL    Cardiac WDL WDL        Peripheral/Neurovascular WDL    Peripheral Neurovascular WDL WDL        Cognitive/Neuro/Behavioral WDL    Cognitive/Neuro/Behavioral WDL WDL

## 2023-11-09 NOTE — H&P
"    [facility]    History and Physical - Hospitalist Service       Date of Admission:  11/8/2023    Assessment & Plan      Peewee Walsh is a 43 year old male admitted on 11/8/2023. He has no notable past medical history and is admitted for appendicitis.    Appendicitis  Began to experience severe abdominal pain and associated nausea around 1400 today.  No change in bowel habits today.  WBC of 14.  CT abdomen and pelvis demonstrated acute appendicitis  - NS at 125 mL/hour  - General surgery consulted.  ED provider spoke with Dr. Lilly from general surgery who is thinking they can get him on the schedule for procedure in the morning  - NPO at midnight  - Pain management with IV Dilaudid 0.5 mg every 2 hours as needed    Hypertension  Patient was hypertensive at 208/119 in the ED.  Patient attributes this to pain  - As needed hydralazine 10 mg ordered.  To be given if systolic BP >180          Diet: NPO per Anesthesia Guidelines for Procedure/Surgery Except for: Meds  DVT Prophylaxis: Low Risk/Ambulatory with no VTE prophylaxis indicated  Bell Catheter: Not present  Fluids: NS at 125 mL/hour  Lines: None     Cardiac Monitoring: None  Code Status: Full Code    Clinically Significant Risk Factors Present on Admission                    # Obesity: Estimated body mass index is 32.98 kg/m  as calculated from the following:    Height as of this encounter: 1.854 m (6' 1\").    Weight as of this encounter: 113.4 kg (250 lb).              Disposition Plan      Expected Discharge Date: 11/09/2023                The patient's care was discussed with the night physician, Dr. Jordon Cramer MD.  Will be seen in the morning by Attending Physician, Dr. Jordan Cho DO .    Petros Gudino DO PGY-2  Hospitalist Service  [facility]  Securely message with [employer] (more info)  Text page via [employer] Paging/Directory "   ______________________________________________________________________    Chief Complaint   Abdominal pain, nausea    History is obtained from the patient    History of Present Illness   Peewee Walsh is a 43 year old male who has no notable past medical history and is admitted for appendicitis.  He reports abdominal pain and associated nausea that began around 1400 today.  Did not improve throughout the day, so patient presented to the ED.  Denies any change in bowel habits today.  No other concerns.      Past Medical History    Past Medical History:   Diagnosis Date    Acute cervical radiculopathy 11/23/2015    Anemia     Blood pressure elevated without history of HTN 04/15/2022    Chronic elbow pain, right 01/02/2020    Chronic headaches 12/01/2017    Chronic pain of both knees 04/15/2022    Chronic pain of right knee 01/07/2021    Chronic right shoulder pain 11/23/2015    Class 1 obesity due to excess calories with serious comorbidity and body mass index (BMI) of 34.0 to 34.9 in adult 10/05/2023    Class 2 obesity in adult 04/15/2022    Community acquired pneumonia     2018    Elevated blood pressure     Hand pain, right 10/03/2017    Tendinitis versus carpal tunnel    Hypertension     Left ankle injury 2010    High-grade tear anterior talofibular ligament    Left foot pain 11/23/2015    Obesity (BMI 30.0-34.9) 12/01/2017    Plantar fasciitis, bilateral 11/23/2015    Shoulder separation     Snoring 12/01/2017    Suspected sleep apnea 04/15/2022    Traumatic complete tear of right rotator cuff 10/05/2023       Past Surgical History   Past Surgical History:   Procedure Laterality Date    COLONOSCOPY      WISDOM TOOTH EXTRACTION     Right rotator cuff repair on 10/9/2023    Social history  Non-smoker  1 alcohol drink every couple months  No illicit drug use    Prior to Admission Medications   None           Physical Exam   Vital Signs: Temp: 98.3  F (36.8  C) Temp src: Temporal BP: (!) 218/120 Pulse: 77    Resp: 24 SpO2: 98 % O2 Device: None (Room air)    Weight: 250 lbs 0 oz    General Appearance: Alert and oriented x3.  In mild distress.  Eyes: EOMI intact.  PERRL.  Conjunctiva normal  HEENT: No obvious ear, nose, or throat abnormalities  Respiratory: Lungs clear to auscultation bilaterally.  No rales, crackles, or wheezes  Cardiovascular: Heart RRR.  No murmurs, rubs, or gallops.  GI: RLQ tenderness to palpation.  Palpation of left lower quadrant elicited pain in RLQ.  No LUQ pain.  Difficult to palpate RUQ given patient's sling  Lymph/Hematologic: No lymphadenopathy  Skin: No obvious skin rashes or lesions  Musculoskeletal: Full ROM of left upper extremity and bilateral lower extremities without pain.  RUE in sling s/p rotator cuff repair on 10/9  Neurologic: Focal deficits.  Cranial nerves II-XII intact.  Sensation intact to light touch all distal dermatomes.  Psychiatric: Normal mood and affect        Data     I have personally reviewed the following data over the past 24 hrs:    14.0 (H)  \   14.4   / 287     137 99 8.9 /  113 (H)   3.8 26 0.77 \     ALT: 20 AST: 19 AP: 58 TBILI: 0.4   ALB: 4.9 TOT PROTEIN: 7.6 LIPASE: 26       Imaging results reviewed over the past 24 hrs:   Recent Results (from the past 24 hour(s))   CT Abdomen Pelvis w Contrast    Narrative    EXAM: CT ABDOMEN PELVIS W CONTRAST  LOCATION: [facility]  DATE: 11/8/2023    INDICATION: Upper abdominal pain  COMPARISON: None.  TECHNIQUE: CT scan of the abdomen and pelvis was performed following injection of IV contrast. Multiplanar reformats were obtained. Dose reduction techniques were used.  CONTRAST: [id number] 90ml    FINDINGS:   LOWER CHEST: Normal.    HEPATOBILIARY: Mild fatty infiltration liver. The gallbladder, bile ducts, and portal veins are normal.    PANCREAS: Normal.    SPLEEN: Accessory splenule.    ADRENAL GLANDS: Normal.    KIDNEYS/BLADDER: Normal.    BOWEL: There are findings consistent with acute  appendicitis. The appendix is enlarged measuring approximately 14 mm in greatest radial dimension with stranding of the adjacent fat, hyperemia of the wall the appendix, and there is an appendicolith seen   in the proximal aspect of the appendix. No evidence for an abscess or free air.    LYMPH NODES: Normal.    VASCULATURE: Unremarkable.    PELVIC ORGANS: Normal.    MUSCULOSKELETAL: Mild multilevel degenerative changes of the spine.      Impression    IMPRESSION:   1.  Acute appendicitis with no free air or abscess.

## 2023-11-09 NOTE — CONSULTS
General Surgery Consultation  Peewee Walsh MRN# [id number]   Age/Sex: 43 year old male YOB: 1980     Reason for consult: 1. Acute appendicitis with localized peritonitis, without perforation, abscess, or gangrene            Requesting physician: Jordan Cho MD                   Assessment and Plan:   Assessment:  Acute appendicitis, without evidence for perforation or abscess.  Discussed with the patient the rationale for pursuing laparoscopic appendectomy, risks and benefits of the procedure, and his anticipated perioperative course.  He wishes to proceed.    Plan:  1.  Laparoscopic appendectomy          Chief Complaint:     Chief Complaint   Patient presents with    Abdominal Pain    Nausea        History is obtained from the patient    HPI:   Peewee Walsh is a 43 year old male who presents through the [facility] emergency department with abdominal pain that began yesterday afternoon.  Has been fairly severe located in the mid abdomen, localizing to the right lower quadrant over the course of the evening and this morning.  Unfortunately this is been exacerbated by having his right arm in a sling with pressure being applied directly to his upper abdomen.  He presented to the emergency department for evaluation where CT imaging was indicative of acute appendicitis.    He recently suffered a rotator cuff tear last month, and his right arm is still in a sling.  Has some limited mobility but can position his right arm along his side.          Past Medical History:     Past Medical History:   Diagnosis Date    Acute cervical radiculopathy 11/23/2015    Anemia     Blood pressure elevated without history of HTN 04/15/2022    Chronic elbow pain, right 01/02/2020    Chronic headaches 12/01/2017    Chronic pain of both knees 04/15/2022    Chronic pain of right knee 01/07/2021    Chronic right shoulder pain 11/23/2015    Class 1 obesity due to excess calories with serious comorbidity and body mass  index (BMI) of 34.0 to 34.9 in adult 10/05/2023    Class 2 obesity in adult 04/15/2022    Community acquired pneumonia     2018    Elevated blood pressure     Hand pain, right 10/03/2017    Tendinitis versus carpal tunnel    Hypertension     Left ankle injury 2010    High-grade tear anterior talofibular ligament    Left foot pain 11/23/2015    Obesity (BMI 30.0-34.9) 12/01/2017    Plantar fasciitis, bilateral 11/23/2015    Shoulder separation     Snoring 12/01/2017    Suspected sleep apnea 04/15/2022    Traumatic complete tear of right rotator cuff 10/05/2023              Past Surgical History:     Past Surgical History:   Procedure Laterality Date    COLONOSCOPY      WISDOM TOOTH EXTRACTION               Social History:    reports that he has never smoked. He has never been exposed to tobacco smoke. He has never used smokeless tobacco. He reports current alcohol use. He reports that he does not use drugs.           Family History:     Family History   Problem Relation Age of Onset    Hypertension Mother     Hypertension Father     Coronary Artery Disease Maternal Grandmother     Coronary Artery Disease Maternal Grandfather     Heart Failure Maternal Grandfather     Coronary Artery Disease Paternal Grandmother         early 60s    Chronic Obstructive Pulmonary Disease Paternal Grandfather     Colon Cancer No family hx of     Prostate Cancer No family hx of     Thyroid Cancer No family hx of               Allergies:   No Known Allergies           Medications:     Prior to Admission medications    Not on File              Review of Systems:   The Review of Systems is negative other than noted in the HPI            Physical Exam:   Patient Vitals for the past 24 hrs:   BP Temp Temp src Pulse Resp SpO2 Height Weight   11/09/23 0741 (!) 170/96 99  F (37.2  C) Oral 118 14 97 % -- --   11/09/23 0328 -- 99.3  F (37.4  C) Axillary -- 20 -- -- --   11/09/23 0243 (!) 182/99 -- -- -- 22 -- -- --   11/09/23 0227 -- 99.3  F (37.4  " C) Oral -- -- -- -- --   11/08/23 2045 (!) 218/120 -- -- 77 -- 98 % -- --   11/08/23 1800 (!) 208/119 98.3  F (36.8  C) Temporal 85 24 100 % 1.854 m (6' 1\") 113.4 kg (250 lb)          Intake/Output Summary (Last 24 hours) at 11/9/2023 0745  Last data filed at 11/9/2023 0600  Gross per 24 hour   Intake 475 ml   Output --   Net 475 ml      Constitutional:   awake, alert, cooperative, no apparent distress, and appears stated age       Eyes:   PERRL, conjunctiva/corneas clear, EOM's intact; no scleral edema or icterus noted        ENT:   Normocephalic, without obvious abnormality, atraumatic, Lips, mucosa, and tongue normal        Lungs:   Normal respiratory effort, no accessory muscle use       Cardiovascular:   Regular rate and rhythm       Abdomen:   Soft, tender palpation of the right lower quadrant         Skin:   Skin color and texture normal for patient, no rashes or lesions              Data:         All imaging studies reviewed by me.    Results for orders placed or performed during the hospital encounter of 11/08/23 (from the past 24 hour(s))   Comprehensive metabolic panel   Result Value Ref Range    Sodium 137 135 - 145 mmol/L    Potassium 3.8 3.4 - 5.3 mmol/L    Carbon Dioxide (CO2) 26 22 - 29 mmol/L    Anion Gap 12 7 - 15 mmol/L    Urea Nitrogen 8.9 6.0 - 20.0 mg/dL    Creatinine 0.77 0.67 - 1.17 mg/dL    GFR Estimate >90 >60 mL/min/1.73m2    Calcium 9.9 8.6 - 10.0 mg/dL    Chloride 99 98 - 107 mmol/L    Glucose 113 (H) 70 - 99 mg/dL    Alkaline Phosphatase 58 40 - 129 U/L    AST 19 0 - 45 U/L    ALT 20 0 - 70 U/L    Protein Total 7.6 6.4 - 8.3 g/dL    Albumin 4.9 3.5 - 5.2 g/dL    Bilirubin Total 0.4 <=1.2 mg/dL   CBC with platelets + differential    Narrative    The following orders were created for panel order CBC with platelets + differential.  Procedure                               Abnormality         Status                     ---------                               -----------         ------       "               CBC with platelets and d...[[id number]]  Abnormal            Final result                 Please view results for these tests on the individual orders.   Lipase   Result Value Ref Range    Lipase 26 13 - 60 U/L   CBC with platelets and differential   Result Value Ref Range    WBC Count 14.0 (H) 4.0 - 11.0 10e3/uL    RBC Count 4.91 4.40 - 5.90 10e6/uL    Hemoglobin 14.4 13.3 - 17.7 g/dL    Hematocrit 42.1 40.0 - 53.0 %    MCV 86 78 - 100 fL    MCH 29.3 26.5 - 33.0 pg    MCHC 34.2 31.5 - 36.5 g/dL    RDW 12.9 10.0 - 15.0 %    Platelet Count 287 150 - 450 10e3/uL    % Neutrophils 81 %    % Lymphocytes 12 %    % Monocytes 6 %    % Eosinophils 1 %    % Basophils 0 %    % Immature Granulocytes 0 %    NRBCs per 100 WBC 0 <1 /100    Absolute Neutrophils 11.1 (H) 1.6 - 8.3 10e3/uL    Absolute Lymphocytes 1.7 0.8 - 5.3 10e3/uL    Absolute Monocytes 0.9 0.0 - 1.3 10e3/uL    Absolute Eosinophils 0.2 0.0 - 0.7 10e3/uL    Absolute Basophils 0.1 0.0 - 0.2 10e3/uL    Absolute Immature Granulocytes 0.1 <=0.4 10e3/uL    Absolute NRBCs 0.0 10e3/uL   CT Abdomen Pelvis w Contrast    Narrative    EXAM: CT ABDOMEN PELVIS W CONTRAST  LOCATION: [facility]  DATE: 11/8/2023    INDICATION: Upper abdominal pain  COMPARISON: None.  TECHNIQUE: CT scan of the abdomen and pelvis was performed following injection of IV contrast. Multiplanar reformats were obtained. Dose reduction techniques were used.  CONTRAST: [id number] 90ml    FINDINGS:   LOWER CHEST: Normal.    HEPATOBILIARY: Mild fatty infiltration liver. The gallbladder, bile ducts, and portal veins are normal.    PANCREAS: Normal.    SPLEEN: Accessory splenule.    ADRENAL GLANDS: Normal.    KIDNEYS/BLADDER: Normal.    BOWEL: There are findings consistent with acute appendicitis. The appendix is enlarged measuring approximately 14 mm in greatest radial dimension with stranding of the adjacent fat, hyperemia of the wall the appendix, and there is an  appendicolith seen   in the proximal aspect of the appendix. No evidence for an abscess or free air.    LYMPH NODES: Normal.    VASCULATURE: Unremarkable.    PELVIC ORGANS: Normal.    MUSCULOSKELETAL: Mild multilevel degenerative changes of the spine.      Impression    IMPRESSION:   1.  Acute appendicitis with no free air or abscess.   UA with Microscopic reflex to Culture    Specimen: Urine, Clean Catch   Result Value Ref Range    Color Urine Colorless Colorless, Straw, Light Yellow, Yellow    Appearance Urine Clear Clear    Glucose Urine Negative Negative mg/dL    Bilirubin Urine Negative Negative    Ketones Urine Negative Negative mg/dL    Specific Gravity Urine <1.005 1.003 - 1.035    Blood Urine Negative Negative    pH Urine 7.5 (H) 5.0 - 7.0    Protein Albumin Urine 10 (A) Negative mg/dL    Urobilinogen Urine <2.0 <2.0 mg/dL    Nitrite Urine Negative Negative    Leukocyte Esterase Urine Negative Negative    RBC Urine 1 <=2 /HPF    WBC Urine <1 <=5 /HPF    Narrative    Urine Culture not indicated        David Carey MD

## 2023-11-09 NOTE — ANESTHESIA PROCEDURE NOTES
Airway       Patient location during procedure: OR       Procedure Start/Stop Times: 11/9/2023 8:37 AM  Staff -        Performed By: CRNAIndications and Patient Condition       Indications for airway management: manish-procedural        Final Airway Details       Final airway type: endotracheal airway       Successful airway: ETT - single  Endotracheal Airway Details        ETT size (mm): 8.0       Cuffed: yes       Successful intubation technique: direct laryngoscopy       DL Blade Type: MAC 3       Grade View of Cords: 1       Adjucts: stylet       Position: Right       Measured from: gums/teeth       Secured at (cm): 23    Post intubation assessment        Placement verified by: capnometry, equal breath sounds and chest rise        Number of attempts at approach: 1       Secured with: silk tape       Ease of procedure: easy       Dentition: Intact       Dental guard used and removed. Dental Guard Type: Standard White.    Medication(s) Administered   Medication Administration Time: 11/9/2023 8:37 AM

## 2023-11-09 NOTE — PHARMACY-ADMISSION MEDICATION HISTORY
Pharmacist Admission Medication History    Admission medication history is complete. The information provided in this note is only as accurate as the sources available at the time of the update.    Information Source(s): Patient, Family member, Clinic records, and CareEverywhere/SureScripts via in-person    Pertinent Information: The patient is currently on no regular medications or OTC/herbal products.      Changes made to PTA medication list:  Added: None  Deleted: None  Changed: None    Medication History Completed By: Carmen Green PharmD, [facility] 11/8/2023 10:07 PM    No outpatient medications have been marked as taking for the 11/8/23 encounter (Hospital Encounter).

## 2023-11-09 NOTE — ED PROVIDER NOTES
EMERGENCY DEPARTMENT ENCOUNTER      NAME: Peewee Walsh  AGE: 43 year old male  YOB: 1980  MRN: [id number]  EVALUATION DATE & TIME: No admission date for patient encounter.    PCP: Earnest Hein    ED PROVIDER: Vin Campos DO      Chief Complaint   Patient presents with    Abdominal Pain    Nausea         FINAL IMPRESSION:  1. Acute appendicitis with localized peritonitis, without perforation, abscess, or gangrene          ED COURSE & MEDICAL DECISION MAKIN-year-old male presented to the ED for evaluation of right-sided abdominal pain with associated nausea and vomiting.  Patient was noted to be hypertensive upon arrival.  He was otherwise hemodynamically stable.  Patient was uncomfortable appearing.  However, he did not appear to be in any acute distress.  On exam the patient was noted to have mild epigastric and right upper quadrant tenderness to palpation.  He did not have evidence of an acute abdomen, however.    Following his initial evaluation the patient was given IV Zofran, famotidine, and Toradol.    White blood cell count was elevated at 14.  The remainder of the CBC, CMP, lipase, and UA were reassuring.    The patient was given IV Dilaudid for further pain control.    CT scan of the abdomen shows findings consistent with acute appendicitis.    The patient was reevaluated and informed of the lab and imaging results.  The patient was informed of the appendicitis and the likely need for surgery.  The patient was started on IV Zosyn.  The patient's case was discussed with the on-call general surgeon Dr. Lilly who states that the patient would likely be taken to the OR in the morning.      The patient's case was then discussed with the admitting resident physician.    Pertinent Labs & Imaging studies reviewed. (See chart for details)  7:46 PM I met with the patient to gather history and to perform my initial exam. We discussed plans for the ED course, including diagnostic  testing and treatment.   10:18 PM I spoke to general surgery.  10:51 PM I spoke to Dr. Corado, hospitalist resident.      At the conclusion of the encounter I discussed the results of all of the tests and the disposition. The questions were answered. The patient or family acknowledged understanding and was agreeable with the care plan.     Medical Decision Making    History:  Supplemental history from: Documented in chart, if applicable  External Record(s) reviewed: Documented in chart, if applicable.    Work Up:  Chart documentation includes differential considered and any EKGs or imaging independently interpreted by provider, where specified.  In additional to work up documented, I considered the following work up: Documented in chart, if applicable.    External consultation:  Discussion of management with another provider: General Surgery and Hospitalist    Complicating factors:  Care impacted by chronic illness: Chronic Pain and Other: metabolic syndrome X  Care affected by social determinants of health: N/A    Disposition considerations: Admit.    PPE worn: n95 mask, goggles    MEDICATIONS GIVEN IN THE EMERGENCY:  Medications   sodium chloride 0.9 % infusion (has no administration in time range)   ondansetron (ZOFRAN ODT) ODT tab 4 mg (has no administration in time range)     Or   ondansetron (ZOFRAN) injection 4 mg (has no administration in time range)   hydrALAZINE (APRESOLINE) injection 10 mg (has no administration in time range)   HYDROmorphone (PF) (DILAUDID) injection 0.5 mg (has no administration in time range)   ondansetron (ZOFRAN) injection 4 mg (4 mg Intravenous $Given 11/8/23 1946)   ketorolac (TORADOL) injection 15 mg (15 mg Intravenous $Given 11/8/23 1955)   famotidine (PEPCID) injection 20 mg (20 mg Intravenous $Given 11/8/23 1956)   iopamidol (ISOVUE-370) solution 90 mL (90 mLs Intravenous $Given 11/8/23 2056)   prochlorperazine (COMPAZINE) injection 10 mg (10 mg Intravenous $Given 11/8/23 2130)  "  HYDROmorphone (PF) (DILAUDID) injection 0.5 mg (0.5 mg Intravenous $Given 11/8/23 2201)   piperacillin-tazobactam (ZOSYN) 3.375 g vial to attach to  mL bag (3.375 g Intravenous $New Bag 11/8/23 9047)       NEW PRESCRIPTIONS STARTED AT TODAY'S ER VISIT  New Prescriptions    No medications on file          =================================================================    HPI    Patient information was obtained from: Patient    Use of : N/A       Peewee Walsh is a 43 year old male with a pertinent history of metabolic syndrome X who presents to this ED via walk-in for evaluation of abdominal pain and nausea.    Patient reports a sharp \"stabbing\" to the center line of his abdomen with some tenderness to the upper right side that started around 2 PM today. Pain was sudden onset and has gotten worse over time, he describes it as severe pain. He notes some dry heaves and has vomited a small amount of bile. Patient reports a subjective fever and that pressure from sling on right arm makes pain worse. Otherwise, patient denies any diarrhea, vomiting up blood, chest pain, shortness of breath, history of abdominal surgery, urinary problems, or pain radiation. No other complaints at this time.       REVIEW OF SYSTEMS   Review of Systems     PAST MEDICAL HISTORY:  Past Medical History:   Diagnosis Date    Acute cervical radiculopathy 11/23/2015    Anemia     Blood pressure elevated without history of HTN 04/15/2022    Chronic elbow pain, right 01/02/2020    Chronic headaches 12/01/2017    Chronic pain of both knees 04/15/2022    Chronic pain of right knee 01/07/2021    Chronic right shoulder pain 11/23/2015    Class 1 obesity due to excess calories with serious comorbidity and body mass index (BMI) of 34.0 to 34.9 in adult 10/05/2023    Class 2 obesity in adult 04/15/2022    Community acquired pneumonia     2018    Elevated blood pressure     Hand pain, right 10/03/2017    Tendinitis versus carpal " tunnel    Hypertension     Left ankle injury 2010    High-grade tear anterior talofibular ligament    Left foot pain 11/23/2015    Obesity (BMI 30.0-34.9) 12/01/2017    Plantar fasciitis, bilateral 11/23/2015    Shoulder separation     Snoring 12/01/2017    Suspected sleep apnea 04/15/2022    Traumatic complete tear of right rotator cuff 10/05/2023       PAST SURGICAL HISTORY:  Past Surgical History:   Procedure Laterality Date    COLONOSCOPY      WISDOM TOOTH EXTRACTION             CURRENT MEDICATIONS:    No current outpatient medications on file.      ALLERGIES:  No Known Allergies    FAMILY HISTORY:  Family History   Problem Relation Age of Onset    Hypertension Mother     Hypertension Father     Coronary Artery Disease Maternal Grandmother     Coronary Artery Disease Maternal Grandfather     Heart Failure Maternal Grandfather     Coronary Artery Disease Paternal Grandmother         early 60s    Chronic Obstructive Pulmonary Disease Paternal Grandfather     Colon Cancer No family hx of     Prostate Cancer No family hx of     Thyroid Cancer No family hx of        SOCIAL HISTORY:   Social History     Socioeconomic History    Marital status:      Spouse name: None    Number of children: None    Years of education: None    Highest education level: None   Occupational History     Comment: 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 No   Social History Narrative    , Lisa  No children  [facility]     Social Determinants of Health     Financial Resource Strain: Unknown (10/5/2023)    Financial Resource Strain     Within the past 12  "months, have you or your family members you live with been unable to get utilities (heat, electricity) when it was really needed?: Patient refused   Food Insecurity: Unknown (10/5/2023)    Food Insecurity     Within the past 12 months, did you worry that your food would run out before you got money to buy more?: Patient refused     Within the past 12 months, did the food you bought just not last and you didn t have money to get more?: Patient refused   Transportation Needs: Unknown (10/5/2023)    Transportation Needs     Within the past 12 months, has lack of transportation kept you from medical appointments, getting your medicines, non-medical meetings or appointments, work, or from getting things that you need?: Patient refused   Interpersonal Safety: Low Risk  (10/5/2023)    Interpersonal Safety     Do you feel physically and emotionally safe where you currently live?: Yes     Within the past 12 months, have you been hit, slapped, kicked or otherwise physically hurt by someone?: No     Within the past 12 months, have you been humiliated or emotionally abused in other ways by your partner or ex-partner?: No   Housing Stability: Unknown (10/5/2023)    Housing Stability     Do you have housing? : Patient refused     Are you worried about losing your housing?: Patient refused       VITALS:  BP (!) 218/120   Pulse 77   Temp 98.3  F (36.8  C) (Temporal)   Resp 24   Ht 1.854 m (6' 1\")   Wt 113.4 kg (250 lb)   SpO2 98%   BMI 32.98 kg/m      PHYSICAL EXAM    General presentation: Alert, Vital signs reviewed. NAD. Uncomfortable appearing.  HENT: ENT inspection is normal. Oropharynx is moist and clear.   Eye: Pupils are equal and reactive to light. EOMI  Neck: The neck is supple, with full ROM, with no evidence of meningismus.  Pulmonary: Currently in no acute respiratory distress. Normal, non labored respirations, the lung sounds are normal with good equal air movement. Clear to auscultation bilaterally. "   Circulatory: Regular rate and rhythm. Peripheral pulses are strong and equal. No murmurs, rubs, or gallops.   Abdominal: The abdomen is soft. No rigidity, guarding, or rebound. Bowel sounds normal. Mild/moderate epigastric tenderness to palpation.   Neurologic: Alert, oriented to person, place, and time. No motor deficit. No sensory deficit. Cranial nerves II through XII are intact.  Musculoskeletal: No extremity tenderness. Full range of motion in all extremities. No extremity edema.   Skin: Skin color is normal. No rash. Warm. Dry to touch.      LAB:  All pertinent labs reviewed and interpreted.  Results for orders placed or performed during the hospital encounter of 11/08/23   CT Abdomen Pelvis w Contrast    Impression    IMPRESSION:   1.  Acute appendicitis with no free air or abscess.   Comprehensive metabolic panel   Result Value Ref Range    Sodium 137 135 - 145 mmol/L    Potassium 3.8 3.4 - 5.3 mmol/L    Carbon Dioxide (CO2) 26 22 - 29 mmol/L    Anion Gap 12 7 - 15 mmol/L    Urea Nitrogen 8.9 6.0 - 20.0 mg/dL    Creatinine 0.77 0.67 - 1.17 mg/dL    GFR Estimate >90 >60 mL/min/1.73m2    Calcium 9.9 8.6 - 10.0 mg/dL    Chloride 99 98 - 107 mmol/L    Glucose 113 (H) 70 - 99 mg/dL    Alkaline Phosphatase 58 40 - 129 U/L    AST 19 0 - 45 U/L    ALT 20 0 - 70 U/L    Protein Total 7.6 6.4 - 8.3 g/dL    Albumin 4.9 3.5 - 5.2 g/dL    Bilirubin Total 0.4 <=1.2 mg/dL   Result Value Ref Range    Lipase 26 13 - 60 U/L   UA with Microscopic reflex to Culture    Specimen: Urine, Clean Catch   Result Value Ref Range    Color Urine Colorless Colorless, Straw, Light Yellow, Yellow    Appearance Urine Clear Clear    Glucose Urine Negative Negative mg/dL    Bilirubin Urine Negative Negative    Ketones Urine Negative Negative mg/dL    Specific Gravity Urine <1.005 1.003 - 1.035    Blood Urine Negative Negative    pH Urine 7.5 (H) 5.0 - 7.0    Protein Albumin Urine 10 (A) Negative mg/dL    Urobilinogen Urine <2.0 <2.0 mg/dL     Nitrite Urine Negative Negative    Leukocyte Esterase Urine Negative Negative    RBC Urine 1 <=2 /HPF    WBC Urine <1 <=5 /HPF   CBC with platelets and differential   Result Value Ref Range    WBC Count 14.0 (H) 4.0 - 11.0 10e3/uL    RBC Count 4.91 4.40 - 5.90 10e6/uL    Hemoglobin 14.4 13.3 - 17.7 g/dL    Hematocrit 42.1 40.0 - 53.0 %    MCV 86 78 - 100 fL    MCH 29.3 26.5 - 33.0 pg    MCHC 34.2 31.5 - 36.5 g/dL    RDW 12.9 10.0 - 15.0 %    Platelet Count 287 150 - 450 10e3/uL    % Neutrophils 81 %    % Lymphocytes 12 %    % Monocytes 6 %    % Eosinophils 1 %    % Basophils 0 %    % Immature Granulocytes 0 %    NRBCs per 100 WBC 0 <1 /100    Absolute Neutrophils 11.1 (H) 1.6 - 8.3 10e3/uL    Absolute Lymphocytes 1.7 0.8 - 5.3 10e3/uL    Absolute Monocytes 0.9 0.0 - 1.3 10e3/uL    Absolute Eosinophils 0.2 0.0 - 0.7 10e3/uL    Absolute Basophils 0.1 0.0 - 0.2 10e3/uL    Absolute Immature Granulocytes 0.1 <=0.4 10e3/uL    Absolute NRBCs 0.0 10e3/uL       RADIOLOGY:  Reviewed all pertinent imaging. Please see official radiology report.  CT Abdomen Pelvis w Contrast   Final Result   IMPRESSION:    1.  Acute appendicitis with no free air or abscess.              I, Sofia Faulkner , am serving as a scribe to document services personally performed by Vin Campos DO based on my observation and the provider's statements to me. I, Vin Campos, attest that Sofia Faulkner is acting in a scribe capacity, has observed my performance of the services and has documented them in accordance with my direction.    Vin Campos DO  Emergency Medicine  [facility] EMERGENCY ROOM  [address] [facility] [address] [phone]       Vin Campos DO  11/08/23 6700

## 2023-11-09 NOTE — PROGRESS NOTES
PRIMARY DIAGNOSIS: appendicitis, HTN   OUTPATIENT/OBSERVATION GOALS TO BE MET BEFORE DISCHARGE:  ADLs back to baseline: No    Activity and level of assistance: did not ambulate     Pain status: Improved but still requiring IV narcotics.    Return to near baseline physical activity: No     Discharge Planner Nurse     Please review provider order for any additional goals.   Nurse to notify provider when observation goals have been met and patient is ready for discharge.    Pt A&O x4, NPO since midnight. PRN IV Dilaudid 0.5mg q2 for R abdominal pain last @0220 helpful per pt. 0.9@125/hr. Pt sleeping in between cares. PRN IV hydralazine 10mg q6hr for SBP>180 last @0243 with result of SBP 160s.

## 2023-11-09 NOTE — DISCHARGE SUMMARY
"[facility]  Hospitalist Discharge Summary      Date of Admission:  11/8/2023  Date of Discharge:  11/9/2023 11:30 AM  Discharging Provider: Jordan Cho MD  Discharge Service: Hospitalist Service    Discharge Diagnoses   1. Acute appendicitis with localized peritonitis, without perforation, abscess, or gangrene            Clinically Significant Risk Factors     # Obesity: Estimated body mass index is 32.98 kg/m  as calculated from the following:    Height as of this encounter: 1.854 m (6' 1\").    Weight as of this encounter: 113.4 kg (250 lb).       Follow-ups Needed After Discharge   Follow up with PCP to recheck BP    Unresulted Labs Ordered in the Past 30 Days of this Admission       Date and Time Order Name Status Description    11/9/2023  8:55 AM Surgical Pathology Exam In process         These results will be followed up by surgical team    Discharge Disposition   Discharged to home  Condition at discharge: Stable    Hospital Course   Patient presented to the hospital with abdominal pain. CT was completed in the ED which showed acute appendicitis. Surgery was consulted who performed above procedures on day of discharge. Patient was discharged from PACU by surgical team.     Consultations This Hospital Stay   SURGERY GENERAL IP CONSULT    Code Status   Prior    Time Spent on this Encounter   I, Jordan Cho MD, personally saw the patient today and spent less than or equal to 30 minutes discharging this patient.       Jordan Cho MD  [facility] GENE[facility]PHASE II  [address] [place] [address]  Phone: [phone]  Fax: [phone]  ______________________________________________________________________    Physical Exam   Vital Signs: Temp: 97.6  F (36.4  C) Temp src: Temporal BP: (!) 154/81 Pulse: 108   Resp: 16 SpO2: 95 % O2 Device: None (Room air) Oxygen Delivery: 2 LPM  Weight: 250 lbs 0 oz  Constitutional: awake, alert, cooperative, " no apparent distress, and appears stated age  Respiratory: No increased work of breathing  Neurologic: Awake, alert, oriented . Moves all extremities  Neuropsychiatric: General: normal, calm, and normal eye contact       Primary Care Physician   Earnest Hein    Discharge Orders      When to call - Contact Surgeon Team    Pain is not controlled by pain medicine or suddenly increases  You develop a fever greater than 101  Foul smell or drainage from your surgery sites  A large amount of bleeding that continues despite moderate pressure  Unable to pass urine within 8-10 hours of surgery     When to call - Reach out to Urgent Care    If you are experiencing uncontrolled Nausea and Vomiting, uncontrolled pain, inability to urinate and uncomfortable, and in need of immediate care, and you are NOT able to reach your Surgeon Team, go to an Urgent Care clinic.     When to call - Reasons to Call 911    Call 911 immediately if you experience sudden-onset chest pain, arm weakness/numbness, slurred speech, or shortness of breath     Symptoms - Fever Management    A low grade fever (<101 F) can be expected after surgery.     Symptoms - Reduced Urine Output    If it has been greater than 8 hours since you have urinated despite drinking plenty of water, call your Surgeon Team.     Weight bearing status - As tolerated     No driving or operating machinery    Do NOT drive any vehicle or operate mechanical equipment for 24 hours following the end of your surgery.  Even though you may feel normal, your reactions may be affected by Anesthesia medication you received.  Following 24 hours, you may drive as long as you are not taking narcotic medications, and can move quickly without limitations on movement due to pain.      Under no circumstances does your surgery permit you to not wear a seatbelt.     No Alcohol    Do NOT drink alcoholic beverages for 24 hours following your surgery and while taking pain medications.     Diet  Instructions    Regular diet. Patients can have difficulty with constipation following surgery, due in part to the administration of narcotic medications.  If you are suffering with constipation, you should avoid foods such as hard cheeses or red meat.  Foods high in fiber are recommended.     Shower/Bathing - Restrictions: Let water run over incisions and pat dry. No tub baths until bleeding stops.    Shower/Bathing - Restrictions: You may shower 24 hours after your operation. Let water run over incisions and pat dry. No tub baths until bleeding stops. Do NOT soak in any body of water (lake, pool, bath, etc.) for 7-10 days postoperatively.     Dressing / Wound Care - Wound    You may remove your Band-aids after a period of 48 hours.  The small white strips on the incisions act like artificial scabs, and will begin to peel at the edges at around 7-10 days.  These can then be removed.     Discharge Instructions - Comfort and Pain Management    Pain after surgery is normal and expected. You will have some amount of pain after surgery. Your pain will improve with time. There are several things you can do to help reduce your pain including: rest, ice, and using pain medications as needed. Use pain interventions and don't wait until pain level is out of control. Contact your Surgeon Team if you have pain that persists or worsens after surgery despite rest, ice, and taking your medication(s) as prescribed. You may have a dry mouth, a sore throat, muscles aches or trouble sleeping, and these symptoms should go away after 24 hours.     Discharge Instruction - Comfort and Pain Management    You may take Tylenol and Ibuprofen in their recommended over the counter strength and frequency for mild to moderate pain. Read the labels on your Over the Counter (OTC) medications carefully and with care.     Discharge Instructions - Rest    You should continue to be active at home, including ambulating frequently.  If possible try to  limit the amount of time spent in bed.     Discharge Instructions - Follow-up Appointment (Weeks)    Follow up: It is our practice to have all patients follow up with us 2-3 weeks after their surgery to ensure they are recovering well.  For straightforward laparoscopic procedures, this can be done either in clinic as a scheduled follow up appointment, or over the phone.  If you would like a scheduled follow up appointment in clinic, please call us at [phone] to schedule an appointment at your convenience.  If you would prefer to follow up with us by phone please let us know so that we may contact you 2-3 weeks following your procedure.     Return to normal activity as tolerated    Return to normal activity as tolerated.  If an activity hurts, don't do it.  If it doesn't hurt, proceed cautiously for the first two weeks.       Significant Results and Procedures   Most Recent 3 CBC's:  Recent Labs   Lab Test 11/08/23  1945 10/05/23  1551 04/15/22  1714   WBC 14.0* 8.4 9.3   HGB 14.4 14.2 13.8   MCV 86 87 88    310 283       Discharge Medications   Discharge Medication List as of 11/9/2023 10:03 AM        START taking these medications    Details   oxyCODONE (ROXICODONE) 5 MG tablet Take 1-2 tablets (5-10 mg) by mouth every 4 hours as needed for moderate to severe pain, Disp-6 tablet, R-0, E-Prescribe           Allergies   No Known Allergies

## 2023-11-09 NOTE — ANESTHESIA POSTPROCEDURE EVALUATION
Patient: Peewee Walsh    Procedure: Procedure(s):  APPENDECTOMY, LAPAROSCOPIC       Anesthesia Type:  General    Note:  Disposition: Outpatient   Postop Pain Control: Uneventful            Sign Out: Well controlled pain   PONV: No   Neuro/Psych: Uneventful            Sign Out: Acceptable/Baseline neuro status   Airway/Respiratory: Uneventful            Sign Out: Acceptable/Baseline resp. status   CV/Hemodynamics: Uneventful            Sign Out: Acceptable CV status; No obvious hypovolemia; No obvious fluid overload   Other NRE: NONE   DID A NON-ROUTINE EVENT OCCUR? No           Last vitals:  Vitals Value Taken Time   /60 11/09/23 0940   Temp 37.5  C (99.5  F) 11/09/23 0944   Pulse 101 11/09/23 0944   Resp 9 11/09/23 0944   SpO2 94 % 11/09/23 0944   Vitals shown include unfiled device data.    Electronically Signed By: Sae Pate MD  November 9, 2023  2:17 PM

## 2023-11-09 NOTE — ANESTHESIA PREPROCEDURE EVALUATION
Anesthesia Pre-Procedure Evaluation    Patient: Peewee Walsh   MRN: [id number] : 1980        Procedure : Procedure(s):  APPENDECTOMY, LAPAROSCOPIC          Past Medical History:   Diagnosis Date    Acute cervical radiculopathy 2015    Anemia     Blood pressure elevated without history of HTN 04/15/2022    Chronic elbow pain, right 2020    Chronic headaches 2017    Chronic pain of both knees 04/15/2022    Chronic pain of right knee 2021    Chronic right shoulder pain 2015    Class 1 obesity due to excess calories with serious comorbidity and body mass index (BMI) of 34.0 to 34.9 in adult 10/05/2023    Class 2 obesity in adult 04/15/2022    Community acquired pneumonia     2018    Elevated blood pressure     Hand pain, right 10/03/2017    Tendinitis versus carpal tunnel    Hypertension     Left ankle injury 2010    High-grade tear anterior talofibular ligament    Left foot pain 2015    Obesity (BMI 30.0-34.9) 2017    Plantar fasciitis, bilateral 2015    Shoulder separation     Snoring 2017    Suspected sleep apnea 04/15/2022    Traumatic complete tear of right rotator cuff 10/05/2023      Past Surgical History:   Procedure Laterality Date    COLONOSCOPY      WISDOM TOOTH EXTRACTION        No Known Allergies   Social History     Tobacco Use    Smoking status: Never     Passive exposure: Never    Smokeless tobacco: Never   Substance Use Topics    Alcohol use: Yes     Comment: Alcoholic Drinks/day: 1-2/ month      Wt Readings from Last 1 Encounters:   23 113.4 kg (250 lb)        Anesthesia Evaluation            ROS/MED HX  ENT/Pulmonary:     (+) sleep apnea, mild,                                      Neurologic:  - neg neurologic ROS     Cardiovascular:     (+)  hypertension- -   -  - -                                   (-) murmur   METS/Exercise Tolerance:     Hematologic:  - neg hematologic  ROS     Musculoskeletal:  - neg musculoskeletal ROS    "  GI/Hepatic:     (+)         appendicitis,           Renal/Genitourinary:       Endo:     (+)               Obesity,       Psychiatric/Substance Use:  - neg psychiatric ROS     Infectious Disease:       Malignancy:  - neg malignancy ROS     Other:  - neg other ROS          Physical Exam    Airway  airway exam normal      Mallampati: II   TM distance: > 3 FB   Neck ROM: full   Mouth opening: > 3 cm    Respiratory Devices and Support         Dental       (+) Completely normal teeth      Cardiovascular   cardiovascular exam normal       Rhythm and rate: regular and normal (-) no murmur    Pulmonary   pulmonary exam normal        breath sounds clear to auscultation           OUTSIDE LABS:  CBC:   Lab Results   Component Value Date    WBC 14.0 (H) 11/08/2023    WBC 8.4 10/05/2023    HGB 14.4 11/08/2023    HGB 14.2 10/05/2023    HCT 42.1 11/08/2023    HCT 40.9 10/05/2023     11/08/2023     10/05/2023     BMP:   Lab Results   Component Value Date     11/08/2023     04/15/2022    POTASSIUM 3.8 11/08/2023    POTASSIUM 3.8 04/15/2022    CHLORIDE 99 11/08/2023    CHLORIDE 105 04/15/2022    CO2 26 11/08/2023    CO2 25 04/15/2022    BUN 8.9 11/08/2023    BUN 10 04/15/2022    CR 0.77 11/08/2023    CR 0.80 04/15/2022     (H) 11/08/2023    GLC 97 04/15/2022     COAGS: No results found for: \"PTT\", \"INR\", \"FIBR\"  POC: No results found for: \"BGM\", \"HCG\", \"HCGS\"  HEPATIC:   Lab Results   Component Value Date    ALBUMIN 4.9 11/08/2023    PROTTOTAL 7.6 11/08/2023    ALT 20 11/08/2023    AST 19 11/08/2023    ALKPHOS 58 11/08/2023    BILITOTAL 0.4 11/08/2023     OTHER:   Lab Results   Component Value Date    MISAEL 9.9 11/08/2023    LIPASE 26 11/08/2023    TSH 1.78 12/13/2018       Anesthesia Plan    ASA Status:  2    NPO Status:  NPO Appropriate    Anesthesia Type: General.     - Airway: ETT   Induction: Intravenous.   Maintenance: Balanced.        Consents    Anesthesia Plan(s) and associated risks, " benefits, and realistic alternatives discussed. Questions answered and patient/representative(s) expressed understanding.     - Discussed: Risks, Benefits and Alternatives for BOTH SEDATION and the PROCEDURE were discussed     - Discussed with:  Patient            Postoperative Care    Pain management: IV analgesics, Oral pain medications.   PONV prophylaxis: Ondansetron (or other 5HT-3), Dexamethasone or Solumedrol     Comments:                Sae Pate MD

## 2023-11-13 LAB
PATH REPORT.COMMENTS IMP SPEC: NORMAL
PATH REPORT.COMMENTS IMP SPEC: NORMAL
PATH REPORT.FINAL DX SPEC: NORMAL
PATH REPORT.GROSS SPEC: NORMAL
PATH REPORT.MICROSCOPIC SPEC OTHER STN: NORMAL
PATH REPORT.RELEVANT HX SPEC: NORMAL
PHOTO IMAGE: NORMAL

## 2023-11-14 DIAGNOSIS — M77.50 CALCANEAL BURSITIS, UNSPECIFIED LATERALITY: Primary | ICD-10-CM

## 2023-11-20 ENCOUNTER — DOCUMENTATION ONLY (OUTPATIENT)
Dept: SLEEP MEDICINE | Facility: CLINIC | Age: 43
End: 2023-11-20
Payer: COMMERCIAL

## 2023-11-20 ENCOUNTER — TRANSFERRED RECORDS (OUTPATIENT)
Dept: HEALTH INFORMATION MANAGEMENT | Facility: CLINIC | Age: 43
End: 2023-11-20

## 2023-11-20 DIAGNOSIS — G47.33 OBSTRUCTIVE SLEEP APNEA (ADULT) (PEDIATRIC): Primary | ICD-10-CM

## 2023-11-20 NOTE — PROGRESS NOTES
Patient was offered choice of vendor and chose [facility].  Patient Peewee Walsh was set up at [place]  on November 20, 2023. Patient received a Resmed Airsense 10 Pressures were set at  6-16 cm H2O.   Patient s ramp is 6 cm H2O for Off and FLEX/EPR is EPR, 2.  Patient received a Resmed Mask name: Airfit [id number]  Nasal mask size Medium, heated tubing and heated humidifier.  Patient has the following compliance requirements: none  Patient has a follow up on TBD with FELICIANO Rivero

## 2023-12-29 ENCOUNTER — MYC MEDICAL ADVICE (OUTPATIENT)
Dept: FAMILY MEDICINE | Facility: CLINIC | Age: 43
End: 2023-12-29
Payer: COMMERCIAL

## 2024-01-04 ENCOUNTER — TRANSFERRED RECORDS (OUTPATIENT)
Dept: HEALTH INFORMATION MANAGEMENT | Facility: CLINIC | Age: 44
End: 2024-01-04
Payer: COMMERCIAL

## 2024-01-17 ENCOUNTER — OFFICE VISIT (OUTPATIENT)
Dept: PODIATRY | Facility: CLINIC | Age: 44
End: 2024-01-17
Attending: INTERNAL MEDICINE
Payer: COMMERCIAL

## 2024-01-17 VITALS — OXYGEN SATURATION: 90 % | BODY MASS INDEX: 33.13 KG/M2 | HEART RATE: 89 BPM | WEIGHT: 250 LBS | HEIGHT: 73 IN

## 2024-01-17 DIAGNOSIS — M21.6X1 PRONATION DEFORMITY OF BOTH FEET: ICD-10-CM

## 2024-01-17 DIAGNOSIS — M72.2 PLANTAR FASCIITIS OF LEFT FOOT: Primary | ICD-10-CM

## 2024-01-17 DIAGNOSIS — M21.6X2 PRONATION DEFORMITY OF BOTH FEET: ICD-10-CM

## 2024-01-17 PROCEDURE — 99243 OFF/OP CNSLTJ NEW/EST LOW 30: CPT | Performed by: PODIATRIST

## 2024-01-17 ASSESSMENT — PAIN SCALES - GENERAL: PAINLEVEL: NO PAIN (1)

## 2024-01-17 NOTE — Clinical Note
"    1/17/2024         RE: Peewee Walsh  [address] [id number]        Dear Colleague,    Thank you for referring your patient, Peewee Walsh, to the [facility]. Please see a copy of my visit note below.    FOOT AND ANKLE SURGERY/PODIATRY CONSULT NOTE         ASSESSMENT:   ***      TREATMENT:  ***        HPI: I was asked to see Peewee Walsh today  ***.  The patient was seen in consultation at the request of Earnest Hein MD for ***.     {PAST MEDICAL HISTORY:650075137::\"***\"}    {SOCIAL HISTORY:613690820::\"***\"}     No Known Allergies       Current Outpatient Medications:      oxyCODONE (ROXICODONE) 5 MG tablet, Take 1-2 tablets (5-10 mg) by mouth every 4 hours as needed for moderate to severe pain, Disp: 6 tablet, Rfl: 0     Family History   Problem Relation Age of Onset     Hypertension Mother      Hypertension Father      Coronary Artery Disease Maternal Grandmother      Coronary Artery Disease Maternal Grandfather      Heart Failure Maternal Grandfather      Coronary Artery Disease Paternal Grandmother         early 60s     Chronic Obstructive Pulmonary Disease Paternal Grandfather      Colon Cancer No family hx of      Prostate Cancer No family hx of      Thyroid Cancer No family hx of         Social History     Socioeconomic History     Marital status:      Spouse name: Not on file     Number of children: Not on file     Years of education: Not on file     Highest education level: Not on file   Occupational History     Comment: 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 No   Social History Narrative    , Lisa  No children  [facility]     Social Determinants of Health     Financial Resource Strain: Unknown (10/5/2023)    Financial Resource Strain      Within the past 12 months, have you or your family members you live with been unable to get utilities (heat, electricity) when it was really needed?: Patient refused   Food Insecurity: Unknown (10/5/2023)    Food Insecurity      Within the past 12 months, did you worry that your food would run out before you got money to buy more?: Patient refused      Within the past 12 months, did the food you bought just not last and you didn t have money to get more?: Patient refused   Transportation Needs: Unknown (10/5/2023)    Transportation Needs      Within the past 12 months, has lack of transportation kept you from medical appointments, getting your medicines, non-medical meetings or appointments, work, or from getting things that you need?: Patient refused   Physical Activity: Not on file   Stress: Not on file   Social Connections: Not on file   Interpersonal Safety: Low Risk  (10/5/2023)    Interpersonal Safety      Do you feel physically and emotionally safe where you currently live?: Yes      Within the past 12 months, have you been hit, slapped, kicked or otherwise physically hurt by someone?: No      Within the past 12 months, have you been humiliated or emotionally abused in other ways by your partner or ex-partner?: No   Housing Stability: Unknown (10/5/2023)    Housing Stability      Do you have housing? : Patient refused      Are you worried about losing your housing?: Patient refused        Review of Systems - Patient denies fever, chills, rash, wound, stiffness, limping, numbness, weakness, heart burn, blood in stool, chest pain with activity, calf pain when walking, shortness of breath with activity, chronic cough, easy bleeding/bruising, swelling of ankles, excessive thirst, fatigue, depression,  "anxiety.  Patient admits to ***.      OBJECTIVE:  Appearance: {appearance:939453::\"alert, well appearing, and in no distress\"}.    There were no vitals taken for this visit.     There is no height or weight on file to calculate BMI.     General appearance: Patient is alert and fully cooperative with history & exam.  No sign of distress is noted during the visit.  Psychiatric: Affect is pleasant & appropriate.  Patient appears motivated to improve health.  Respiratory: Breathing is regular & unlabored while sitting.  HEENT: Hearing is intact to spoken word.  Speech is clear.  No gross evidence of visual impairment that would impact ambulation.    Vascular: Dorsalis pedis and posterior tibial pulses are palpable. There is pedal hair growth ***.  CFT < 3 sec from anterior tibial surface to distal digits ***. There is no appreciable edema noted.  Dermatologic: Turgor and texture are within normal limits. No coloration or temperature changes. No primary or secondary lesions noted.  Neurologic: All epicritic and proprioceptive sensations are grossly intact ***.  Musculoskeletal: All active and passive ankle, subtalar, midtarsal, and 1st MPJ range of motion are grossly intact without pain or crepitus, with the exception of ***. Manual muscle strength is ***. All dorsiflexors, plantarflexors, invertors, evertors are intact ***. Tenderness present to *** on palpation. Tenderness to *** with range of motion. Calf is soft/non-tender with/without warmth/induration    Imaging:     {Imaging order/result:[id number]}  No images are attached to the encounter or orders placed in the encounter.     No results found.   No results found.       TREATMENT:  ***    Markell Cooper DPM  [facility]Podiatry         Again, thank you for allowing me to participate in the care of your patient.        Sincerely,        Markell Parker DPM  "

## 2024-01-17 NOTE — PROGRESS NOTES
FOOT AND ANKLE SURGERY/PODIATRY CONSULT NOTE         ASSESSMENT:   Plantar fasciitis left foot  Pronation deformity bilateral feet      TREATMENT:  I have recommended orthotics.  I have asked the patient to return to clinic if his pain persisted which time I will recommend a cortisone injection in the left heel.        HPI: I was asked to see Peewee PALMER Ridonis today to evaluate and treat moderate to severe left heel pain.  Patient stated that his pain is intermittent.  He has had pain for the past 3 years.  There are times when the pain is severe and he has episodes where the pain is extremely mild.  The pain is aggravated with weightbearing and ambulation.  The pain is located on the bottom of the left heel.  He denies any trauma to his foot.  He has not had any associated redness or swelling.  He denies any other previous treatment.  The patient was seen in consultation at the request of Earnest Hein MD for evaluation and treatment of left heel pain.     Past Medical History:   Diagnosis Date    Acute cervical radiculopathy 11/23/2015    Anemia     Blood pressure elevated without history of HTN 04/15/2022    Chronic elbow pain, right 01/02/2020    Chronic headaches 12/01/2017    Chronic pain of both knees 04/15/2022    Chronic pain of right knee 01/07/2021    Chronic right shoulder pain 11/23/2015    Class 1 obesity due to excess calories with serious comorbidity and body mass index (BMI) of 34.0 to 34.9 in adult 10/05/2023    Class 2 obesity in adult 04/15/2022    Community acquired pneumonia     2018    Elevated blood pressure     Hand pain, right 10/03/2017    Tendinitis versus carpal tunnel    Hypertension     Left ankle injury 2010    High-grade tear anterior talofibular ligament    Left foot pain 11/23/2015    Obesity (BMI 30.0-34.9) 12/01/2017    Plantar fasciitis, bilateral 11/23/2015    Shoulder separation     Snoring 12/01/2017    Suspected sleep apnea 04/15/2022    Traumatic complete tear of  right rotator cuff 10/05/2023       Social History     Socioeconomic History    Marital status:      Spouse name: Not on file    Number of children: Not on file    Years of education: Not on file    Highest education level: Not on file   Occupational History     Comment: 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 No   Social History Narrative    , Lisa  No children  [facility]     Social Determinants of Health     Financial Resource Strain: Unknown (10/5/2023)    Financial Resource Strain     Within the past 12 months, have you or your family members you live with been unable to get utilities (heat, electricity) when it was really needed?: Patient refused   Food Insecurity: Unknown (10/5/2023)    Food Insecurity     Within the past 12 months, did you worry that your food would run out before you got money to buy more?: Patient refused     Within the past 12 months, did the food you bought just not last and you didn t have money to get more?: Patient refused   Transportation Needs: Unknown (10/5/2023)    Transportation Needs     Within the past 12 months, has lack of transportation kept you from medical appointments, getting your medicines, non-medical meetings or appointments, work, or from getting things that you need?: Patient refused   Physical Activity: Not on file   Stress: Not on file   Social Connections: Not on file   Interpersonal Safety: Low Risk  (10/5/2023)    Interpersonal Safety     Do you feel physically and emotionally safe where you currently live?: Yes     Within the past 12 months, have you been hit, slapped,  kicked or otherwise physically hurt by someone?: No     Within the past 12 months, have you been humiliated or emotionally abused in other ways by your partner or ex-partner?: No   Housing Stability: Unknown (10/5/2023)    Housing Stability     Do you have housing? : Patient refused     Are you worried about losing your housing?: Patient refused        No Known Allergies       Current Outpatient Medications:     oxyCODONE (ROXICODONE) 5 MG tablet, Take 1-2 tablets (5-10 mg) by mouth every 4 hours as needed for moderate to severe pain, Disp: 6 tablet, Rfl: 0     Family History   Problem Relation Age of Onset    Hypertension Mother     Hypertension Father     Coronary Artery Disease Maternal Grandmother     Coronary Artery Disease Maternal Grandfather     Heart Failure Maternal Grandfather     Coronary Artery Disease Paternal Grandmother         early 60s    Chronic Obstructive Pulmonary Disease Paternal Grandfather     Colon Cancer No family hx of     Prostate Cancer No family hx of     Thyroid Cancer No family hx of         Social History     Socioeconomic History    Marital status:      Spouse name: Not on file    Number of children: Not on file    Years of education: Not on file    Highest education level: Not on file   Occupational History     Comment: 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 No   Social History Narrative    , Lisa  No children  [facility]     Social Determinants of Health     Financial Resource Strain: Unknown (10/5/2023)    Financial Resource Strain     Within the past 12 months, have you or your family  members you live with been unable to get utilities (heat, electricity) when it was really needed?: Patient refused   Food Insecurity: Unknown (10/5/2023)    Food Insecurity     Within the past 12 months, did you worry that your food would run out before you got money to buy more?: Patient refused     Within the past 12 months, did the food you bought just not last and you didn t have money to get more?: Patient refused   Transportation Needs: Unknown (10/5/2023)    Transportation Needs     Within the past 12 months, has lack of transportation kept you from medical appointments, getting your medicines, non-medical meetings or appointments, work, or from getting things that you need?: Patient refused   Physical Activity: Not on file   Stress: Not on file   Social Connections: Not on file   Interpersonal Safety: Low Risk  (10/5/2023)    Interpersonal Safety     Do you feel physically and emotionally safe where you currently live?: Yes     Within the past 12 months, have you been hit, slapped, kicked or otherwise physically hurt by someone?: No     Within the past 12 months, have you been humiliated or emotionally abused in other ways by your partner or ex-partner?: No   Housing Stability: Unknown (10/5/2023)    Housing Stability     Do you have housing? : Patient refused     Are you worried about losing your housing?: Patient refused        Review of Systems - Patient denies fever, chills, rash, wound, stiffness, limping, numbness, weakness, heart burn, blood in stool, chest pain with activity, calf pain when walking, shortness of breath with activity, chronic cough, easy bleeding/bruising, swelling of ankles, excessive thirst, fatigue, depression, anxiety.  Patient admits to left heel pain.      OBJECTIVE:  Appearance: alert, well appearing, and in no distress.    There were no vitals taken for this visit.     There is no height or weight on file to calculate BMI.     General appearance: Patient is alert and fully  cooperative with history & exam.  No sign of distress is noted during the visit.  Psychiatric: Affect is pleasant & appropriate.  Patient appears motivated to improve health.  Respiratory: Breathing is regular & unlabored while sitting.  HEENT: Hearing is intact to spoken word.  Speech is clear.  No gross evidence of visual impairment that would impact ambulation.    Vascular: Dorsalis pedis and posterior tibial pulses are palpable. There is no pedal hair growth bilaterally.  CFT < 3 sec from anterior tibial surface to distal digits bilaterally. There is no appreciable edema noted.  Dermatologic: Turgor and texture are within normal limits. No coloration or temperature changes. No primary or secondary lesions noted.  Neurologic: All epicritic and proprioceptive sensations are grossly intact bilaterally.  Musculoskeletal: All active and passive ankle, subtalar, midtarsal, and 1st MPJ range of motion are grossly intact without pain or crepitus, with the exception of none. Manual muscle strength is within normal limits bilaterally. All dorsiflexors, plantarflexors, invertors, evertors are intact bilaterally. Tenderness present to the plantar medial aspect of the left heel on palpation.  No tenderness to the left foot or ankle with range of motion.  There is severe flattening of the medial longitudinal arch noted bilaterally.  Calf is soft/non-tender without warmth/induration    Imaging:       No images are attached to the encounter or orders placed in the encounter.     No results found.   No results found.           Markell Parker; LIZZ  [facility]

## 2024-01-17 NOTE — PATIENT INSTRUCTIONS
What are Prescription Custom Orthotics?  Custom orthotics are specially-made devices designed to support and comfort your feet. Prescription orthotics are crafted for you and no one else. They match the contours of your feet precisely and are designed for the way you move. Orthotics are only manufactured after a podiatrist has conducted a complete evaluation of your feet, ankles, and legs, so the orthotic can accommodate your unique foot structure and pathology.  Prescription orthotics are divided into two categories:  Functional orthotics are designed to control abnormal motion. They may be used to treat foot pain caused by abnormal motion; they can also be used to treat injuries such as shin splints or tendinitis. Functional orthotics are usually crafted of a semi-rigid material such as plastic or graphite.  Accommodative orthotics are softer and meant to provide additional cushioning and support. They can be used to treat diabetic foot ulcers, painful calluses on the bottom of the foot, and other uncomfortable conditions.  Podiatrists use orthotics to treat foot problems such as plantar fasciitis, bursitis, tendinitis, diabetic foot ulcers, and foot, ankle, and heel pain. Clinical research studies have shown that podiatrist-prescribed foot orthotics decrease foot pain and improve function.  Orthotics typically cost more than shoe inserts purchased in a retail store, but the additional cost is usually well worth it. Unlike shoe inserts, orthotics are molded to fit each individual foot, so you can be sure that your orthotics fit and do what they're supposed to do. Prescription orthotics are also made of top-notch materials and last many years when cared for properly. Insurance often helps pay for prescription orthotics.  What are Shoe Inserts?   You've seen them at the grocery store and at the mall. You've probably even seen them on TV and online. Shoe inserts are any kind of non-prescription foot support designed  to be worn inside a shoe. Pre-packaged, mass produced, arch supports are shoe inserts. So are the  custom-made  insoles and foot supports that you can order online or at retail stores. Unless the device has been prescribed by a doctor and crafted for your specific foot, it's a shoe insert, not a custom orthotic device--despite what the ads might say.  Shoe inserts can be very helpful for a variety of foot ailments, including flat arches and foot and leg pain. They can cushion your feet, provide comfort, and support your arches, but they can't correct biomechanical foot problems or cure long-standing foot issues.  The most common types of shoe inserts are:  Arch supports: Some people have high arches. Others have low arches or flat feet. Arch supports generally have a  bumped-up  appearance and are designed to support the foot's natural arch.   Insoles: Insoles slip into your shoe to provide extra cushioning and support. Insoles are often made of gel, foam, or plastic.   Heel liners: Heel liners, sometimes called heel pads or heel cups, provide extra cushioning in the heel region. They may be especially useful for patients who have foot pain caused by age-related thinning of the heels' natural fat pads.   Foot cushions: Do your shoes rub against your heel or your toes? Foot cushions come in many different shapes and sizes and can be used as a barrier between you and your shoe.  Choosing an Over-the-Counter Shoe Insert  Selecting a shoe insert from the wide variety of devices on the market can be overwhelming. Here are some podiatrist-tested tips to help you find the insert that best meets your needs:  Consider your health. Do you have diabetes? Problems with circulation? An over-the-counter insert may not be your best bet. Diabetes and poor circulation increase your risk of foot ulcers and infections, so schedule an appointment with a podiatrist. He or she can help you select a solution that won't cause additional  health problems.   Think about the purpose. Are you planning to run a marathon, or do you just need a little arch support in your work shoes? Look for a product that fits your planned level of activity.   Bring your shoes. For the insert to be effective, it has to fit into your shoes. So bring your sneakers, dress shoes, or work boots--whatever you plan to wear with your insert. Look for an insert that will fit the contours of your shoe.   Try them on. If all possible, slip the insert into your shoe and try it out. Walk around a little. How does it feel? Don't assume that feelings of pressure will go away with continued wear. (If you can't try the inserts at the store, ask about the store's return policy and hold on to your receipt.)    Please call one of the [facility] [place] locations below to schedule an appointment. If you received a prescription please bring it with you to your appointment. Some locations are limited to what they carry.    Office Locations    [facility]  [address] [place] [address]  Home Medical Equipment, [address]   Phone: [phone]   Orthotics and Prosthetics, [address]   Phone: [phone]    [facility]   Home Medical Equipment   [address], [place] [address]  Phone :[phone]  Orthotics and Prosthetics   [address], [address], [facility] [address]  Phone:[phone]          [facility]  [address]  [address]   [place] [address]   Phone: [phone]    [place] [facility]  [address]  [place] [address]  Phone: [phone]    [facility]  [address]  [place] [address]  Phone: [phone]  Fax: [phone]    [facility]   [address]  [place] [address]  Phone:  [phone]    [facility]  [address]  [place] [address]  Phone: [phone]    [facility]  [address]  [place] [address]  Phone: [phone]    [place]   [address] [place] [address]   Phone: [phone]    WEARING YOUR CUSTOM FOOT ORTHOTICS   Most insurance plans cover one pair of orthotics per year. You must check with your   insurance plan to see what your payment responsibility will be. Please call your   insurance company by calling the number on the back of your insurance card.   Orthotic's are non-refundable and non-returnable.   Orthotics are made of various designs. Some orthotics are covered with material that extends beyond your toes. If your orthotic is of this design, you will likely need to trim the toe end to get a proper fit. The insole from your shoe can be used as a template. Simply overlay the shoe insert on top of the custom orthotic. Align the heel end while tracing the length of the insert onto the custom orthotic. Use a large scissor to trim the toe end until you get a proper fit in the shoe.   The orthotic needs to be pushed as far back in the shoe as possible. The heel portion should not ride forward so as not to irritate your heel.   Orthotics are designed to work with socks. Excessive perspiration will shorten the life span of the orthotics. Remove the orthotic from the shoe frequently for proper drying.   The break-in period lasts for weeks. People new to orthotics will likely experience new aches and pains. The orthotic is forcing your foot into a new position. Arch, foot and leg muscle aches and fatigue are common during these weeks. Minor discomfort can be considered normal break in phenomenon. Start wearing your orthotic around your home your first day. Limited activity for one to two hours is recommended. You can increase one or two additional hours each  day provided the aches and pains are subsiding. The degree of discomfort, fatigue and problems will dictate the speed of break in. You may require multiple weeks to work up to full time use.   Do not continue wearing your orthotics if they are creating problems such as blisters or sores. Do not hesitate to call the clinic to speak with a nurse regarding orthotic   break in, fit, trimming, etc. You may also need to see the doctor if the orthotics are   simply not working out. Adjustments are sometimes made to improve orthotic   function.     Orthotics will only work in certain styles and types of shoes. Orthotics rarely work in dress shoes. Slip-ons, clogs, sandals and heels are particularly troublesome. Specially designed orthotics may be necessary for these types of shoes. Your custom orthotic was designed for activities that require appropriate walking or running shoes. Lace up athletic shoes, walking shoes or work boots should work appropriately. You may need a wider or longer shoe. Shoes with a removable  or insert work best. In general, you want to remove an insert from the shoe before placing the orthotic into the shoe. Shoes without a removable liner may not work as well.     When purchasing new shoes, bring your orthotics along to get a proper fit. Shop at stores that are familiar with orthotics.   Frequent washing of the orthotic may shorten the life span of the top cover. The top cover can be replaced but will generally last one to five years depending on use and foot perspiration.

## 2024-01-22 ENCOUNTER — OFFICE VISIT (OUTPATIENT)
Dept: FAMILY MEDICINE | Facility: CLINIC | Age: 44
End: 2024-01-22
Payer: COMMERCIAL

## 2024-01-22 VITALS
DIASTOLIC BLOOD PRESSURE: 96 MMHG | OXYGEN SATURATION: 99 % | HEART RATE: 81 BPM | BODY MASS INDEX: 34.5 KG/M2 | RESPIRATION RATE: 16 BRPM | SYSTOLIC BLOOD PRESSURE: 147 MMHG | TEMPERATURE: 98.4 F | HEIGHT: 73 IN | WEIGHT: 260.3 LBS

## 2024-01-22 DIAGNOSIS — E66.811 CLASS 1 OBESITY DUE TO EXCESS CALORIES WITH SERIOUS COMORBIDITY AND BODY MASS INDEX (BMI) OF 34.0 TO 34.9 IN ADULT: Primary | ICD-10-CM

## 2024-01-22 DIAGNOSIS — E88.810 METABOLIC SYNDROME X: ICD-10-CM

## 2024-01-22 DIAGNOSIS — G47.33 OSA (OBSTRUCTIVE SLEEP APNEA): Chronic | ICD-10-CM

## 2024-01-22 DIAGNOSIS — E66.09 CLASS 1 OBESITY DUE TO EXCESS CALORIES WITH SERIOUS COMORBIDITY AND BODY MASS INDEX (BMI) OF 34.0 TO 34.9 IN ADULT: Primary | ICD-10-CM

## 2024-01-22 PROCEDURE — 99213 OFFICE O/P EST LOW 20 MIN: CPT | Mod: 24 | Performed by: FAMILY MEDICINE

## 2024-01-22 NOTE — PROGRESS NOTES
"  Assessment & Plan   Problem List Items Addressed This Visit       YFN (obstructive sleep apnea) (Chronic)    Class 1 obesity due to excess calories with serious comorbidity and body mass index (BMI) of 34.0 to 34.9 in adult - Primary     43 year old year old male in clinic today to discuss treatment of the following conditions through diet and lifestyle modification and weight loss:  1. Class 1 obesity due to excess calories with serious comorbidity and body mass index (BMI) of 34.0 to 34.9 in adult    2. YFN (obstructive sleep apnea)    3. Metabolic syndrome X    The patient's weight loss result since the last visit was mixed based on weight stability.  The patient expresses frustration that he has not gained traction with weight loss.  Injectable weight loss medicines are prohibitively expensive.  He did not find anorexia genic medication such as diethylpropion to be particular helpful.  Given his blood pressure measurement and today, it is contraindicated.  We do lengthy discussion regarding nutrition.  I think he would do well to focus on increasing vegetables and breaking up his protein consumption into several smaller meals throughout the day.  He is limited with physical activity.  He will follow-up with his primary care provider regarding blood pressure.  I told the patient that if his blood pressure remains elevated that he should be on an antihypertensive medication.         Metabolic syndrome X        BMI  Estimated body mass index is 34.34 kg/m  as calculated from the following:    Height as of this encounter: 1.854 m (6' 1\").    Weight as of this encounter: 118.1 kg (260 lb 4.8 oz).   Weight management plan: Discussed healthy diet and exercise guidelines      Miriam Moran is a 43 year old, presenting for the following health issues:  Weight Loss (Follow-up/)        1/22/2024     2:16 PM   Additional Questions   Roomed by dolly     Weight:   - stable overall.   - he had rotator cuff surgery and acute " "appendectomy   - nutrition: \"It doesn't seem to matter.\"  \"Its mostly the calories.\"  He notes weight gain with veggies.   - he has been trying to eat more meat.     - movement: \"not a whole lot.\" Still recoving from shoulder surgery    - sleep: \"crappy.\" Strugglign with CPAP,           Objective    BP (!) 147/96 (BP Location: Left arm, Patient Position: Sitting, Cuff Size: Adult Large)   Pulse 81   Temp 98.4  F (36.9  C) (Oral)   Resp 16   Ht 1.854 m (6' 1\")   Wt 118.1 kg (260 lb 4.8 oz)   SpO2 99%   BMI 34.34 kg/m    Body mass index is 34.34 kg/m .  Physical Exam  Nursing note reviewed.   Constitutional:       General: He is not in acute distress.     Appearance: Normal appearance. He is not ill-appearing.   HENT:      Head: Normocephalic and atraumatic.   Eyes:      Extraocular Movements: Extraocular movements intact.      Conjunctiva/sclera: Conjunctivae normal.   Pulmonary:      Effort: Pulmonary effort is normal.   Neurological:      Mental Status: He is alert and oriented to person, place, and time.   Psychiatric:         Attention and Perception: Attention normal.         Mood and Affect: Mood normal.         Speech: Speech normal.         Thought Content: Thought content normal.                Signed Electronically by: Doc De Oliveira MD    "

## 2024-01-22 NOTE — ASSESSMENT & PLAN NOTE
43 year old year old male in clinic today to discuss treatment of the following conditions through diet and lifestyle modification and weight loss:  1. Class 1 obesity due to excess calories with serious comorbidity and body mass index (BMI) of 34.0 to 34.9 in adult    2. YFN (obstructive sleep apnea)    3. Metabolic syndrome X      The patient's weight loss result since the last visit was mixed based on weight stability.  The patient expresses frustration that he has not gained traction with weight loss.  Injectable weight loss medicines are prohibitively expensive.  He did not find anorexia genic medication such as diethylpropion to be particular helpful.  Given his blood pressure measurement and today, it is contraindicated.  We do lengthy discussion regarding nutrition.  I think he would do well to focus on increasing vegetables and breaking up his protein consumption into several smaller meals throughout the day.  He is limited with physical activity.  He will follow-up with his primary care provider regarding blood pressure.  I told the patient that if his blood pressure remains elevated that he should be on an antihypertensive medication.

## 2024-02-15 ENCOUNTER — TRANSFERRED RECORDS (OUTPATIENT)
Dept: HEALTH INFORMATION MANAGEMENT | Facility: CLINIC | Age: 44
End: 2024-02-15
Payer: COMMERCIAL

## 2024-04-12 ENCOUNTER — TRANSFERRED RECORDS (OUTPATIENT)
Dept: HEALTH INFORMATION MANAGEMENT | Facility: CLINIC | Age: 44
End: 2024-04-12
Payer: COMMERCIAL

## 2024-06-06 ENCOUNTER — TRANSFERRED RECORDS (OUTPATIENT)
Dept: HEALTH INFORMATION MANAGEMENT | Facility: CLINIC | Age: 44
End: 2024-06-06
Payer: COMMERCIAL

## 2024-07-21 ENCOUNTER — HEALTH MAINTENANCE LETTER (OUTPATIENT)
Age: 44
End: 2024-07-21

## 2025-07-22 ENCOUNTER — OFFICE VISIT (OUTPATIENT)
Dept: INTERNAL MEDICINE | Facility: CLINIC | Age: 45
End: 2025-07-22
Payer: COMMERCIAL

## 2025-07-22 VITALS
DIASTOLIC BLOOD PRESSURE: 100 MMHG | SYSTOLIC BLOOD PRESSURE: 156 MMHG | TEMPERATURE: 98.3 F | HEART RATE: 79 BPM | RESPIRATION RATE: 14 BRPM | HEIGHT: 73 IN | BODY MASS INDEX: 33.93 KG/M2 | WEIGHT: 256 LBS

## 2025-07-22 DIAGNOSIS — R63.1 INCREASED THIRST: ICD-10-CM

## 2025-07-22 DIAGNOSIS — R03.0 ELEVATED BLOOD PRESSURE READING WITHOUT DIAGNOSIS OF HYPERTENSION: ICD-10-CM

## 2025-07-22 DIAGNOSIS — R06.09 DYSPNEA ON EXERTION: Primary | ICD-10-CM

## 2025-07-22 PROBLEM — K35.30 ACUTE APPENDICITIS WITH LOCALIZED PERITONITIS, WITHOUT PERFORATION, ABSCESS, OR GANGRENE: Status: RESOLVED | Noted: 2023-11-08 | Resolved: 2025-07-22

## 2025-07-22 PROCEDURE — G2211 COMPLEX E/M VISIT ADD ON: HCPCS | Performed by: INTERNAL MEDICINE

## 2025-07-22 PROCEDURE — 3077F SYST BP >= 140 MM HG: CPT | Performed by: INTERNAL MEDICINE

## 2025-07-22 PROCEDURE — 99214 OFFICE O/P EST MOD 30 MIN: CPT | Performed by: INTERNAL MEDICINE

## 2025-07-22 PROCEDURE — 3080F DIAST BP >= 90 MM HG: CPT | Performed by: INTERNAL MEDICINE

## 2025-07-22 NOTE — PROGRESS NOTES
Assessment & Plan     Dyspnea on exertion  45-year-old male experiencing new onset of dyspnea with exertion over the past 4 weeks.  He was splitting logs for several days and when throwing the logs on a wood pile, he found himself becoming increasingly more short of breath with activity.  This dyspnea with exertion has persisted for the past 4 weeks.  Symptoms are improved at rest and do not bother him at night.  No orthopnea or PND.  No edema.  However, he feels like he cannot get a full breath and is feeling easily breathless with even mild activity.  There is a slight cough when taking a deep breath.  Denies any chest pain.  No palpitations.  He has had no fevers or chills.  No night sweats.  No significant weight change.  No history of asthma.  Non-smoker.  Some other associated symptoms including increased thirst.  Blood pressure found to be elevated today 156/100.  Lungs are clear although there are decreased breath sounds at his left base raising question of a pleural effusion.  Heart is regular rate and rhythm without murmur.  No peripheral edema.  At this time, unclear etiology of symptoms.  Rule out anemia with CBC.  We discussed potential respiratory causes for symptoms including pneumonia, pleural effusion, or pulmonary embolus.  Cardiac etiologies could include coronary ischemia, congestive heart failure, pericardial effusion or valvular heart disease.  Exam findings most consistent with pleural effusion with or without underlying infection.  In addition to labs such as BNP and D-dimer, will obtain a chest x-ray and consider further imaging with CTA chest depending on results.  Cardiac workup if no pulmonary causes found.  - XR Chest 2 Views; Future  - CBC with platelets; Future  - D dimer, quantitative; Future  - NT-proBNP; Future    Elevated blood pressure reading without diagnosis of hypertension  Blood pressure is moderately elevated.  No previous history of hypertension.  Checking appropriate  "labs.  Unclear how much this is contributing to the above symptoms but his blood pressure needs to be treated.  Once I have his lab results available, we will decide the best blood pressure medication to start.  - Comprehensive metabolic panel (BMP + Alb, Alk Phos, ALT, AST, Total. Bili, TP); Future  - TSH with free T4 reflex; Future    Increased thirst  Checking glucose to screen for diabetes and will also obtain renal function and electrolytes    The longitudinal plan of care for the diagnosis(es)/condition(s) as documented were addressed during this visit. Due to the added complexity in care, I will continue to support Dean in the subsequent management and with ongoing continuity of care.       Follow-up  Return if symptoms worsen or fail to improve.    Subjective   Dean is a 45 year old, presenting for the following health issues: Increasing shortness of breath.  See assessment and plan for details  Shortness of Breath (Labored breathing with exertion. When he takes a deep breathe he will  )                  Review of Systems  Constitutional, HEENT, cardiovascular, pulmonary, gi and gu systems are negative, except as otherwise noted.      Objective    BP (!) 156/100   Pulse 79   Temp 98.3  F (36.8  C)   Resp 14   Ht 1.854 m (6' 1\")   Wt 116.1 kg (256 lb)   BMI 33.78 kg/m    Body mass index is 33.78 kg/m .  Physical Exam   Middle-age male who does not appear in any acute respiratory distress  O2 sats 98% on room air  Lungs clear but decreased breath sounds left base  Heart regular rate and rhythm without murmur or gallop  No peripheral edema              Signed Electronically by: Earnest Hein MD    "

## 2025-07-24 ENCOUNTER — ANCILLARY PROCEDURE (OUTPATIENT)
Dept: GENERAL RADIOLOGY | Facility: CLINIC | Age: 45
End: 2025-07-24
Attending: INTERNAL MEDICINE
Payer: COMMERCIAL

## 2025-07-24 ENCOUNTER — LAB (OUTPATIENT)
Dept: LAB | Facility: CLINIC | Age: 45
End: 2025-07-24
Payer: COMMERCIAL

## 2025-07-24 DIAGNOSIS — R06.09 DYSPNEA ON EXERTION: ICD-10-CM

## 2025-07-24 DIAGNOSIS — R03.0 ELEVATED BLOOD PRESSURE READING WITHOUT DIAGNOSIS OF HYPERTENSION: ICD-10-CM

## 2025-07-24 LAB
ALBUMIN SERPL BCG-MCNC: 4.7 G/DL (ref 3.5–5.2)
ALP SERPL-CCNC: 43 U/L (ref 40–150)
ALT SERPL W P-5'-P-CCNC: 30 U/L (ref 0–70)
ANION GAP SERPL CALCULATED.3IONS-SCNC: 12 MMOL/L (ref 7–15)
AST SERPL W P-5'-P-CCNC: 24 U/L (ref 0–45)
BILIRUB SERPL-MCNC: 0.5 MG/DL
BUN SERPL-MCNC: 8.4 MG/DL (ref 6–20)
CALCIUM SERPL-MCNC: 9.4 MG/DL (ref 8.8–10.4)
CHLORIDE SERPL-SCNC: 102 MMOL/L (ref 98–107)
CREAT SERPL-MCNC: 0.78 MG/DL (ref 0.67–1.17)
D DIMER PPP FEU-MCNC: <0.27 UG/ML FEU (ref 0–0.5)
EGFRCR SERPLBLD CKD-EPI 2021: >90 ML/MIN/1.73M2
ERYTHROCYTE [DISTWIDTH] IN BLOOD BY AUTOMATED COUNT: 12.5 % (ref 10–15)
GLUCOSE SERPL-MCNC: 108 MG/DL (ref 70–99)
HCO3 SERPL-SCNC: 23 MMOL/L (ref 22–29)
HCT VFR BLD AUTO: 38.6 % (ref 40–53)
HGB BLD-MCNC: 13.6 G/DL (ref 13.3–17.7)
MCH RBC QN AUTO: 31.3 PG (ref 26.5–33)
MCHC RBC AUTO-ENTMCNC: 35.2 G/DL (ref 31.5–36.5)
MCV RBC AUTO: 89 FL (ref 78–100)
NT-PROBNP SERPL-MCNC: <36 PG/ML (ref 0–93)
PLATELET # BLD AUTO: 258 10E3/UL (ref 150–450)
POTASSIUM SERPL-SCNC: 3.9 MMOL/L (ref 3.4–5.3)
PROT SERPL-MCNC: 7.3 G/DL (ref 6.4–8.3)
RBC # BLD AUTO: 4.35 10E6/UL (ref 4.4–5.9)
SODIUM SERPL-SCNC: 137 MMOL/L (ref 135–145)
TSH SERPL DL<=0.005 MIU/L-ACNC: 2.83 UIU/ML (ref 0.3–4.2)
WBC # BLD AUTO: 8.4 10E3/UL (ref 4–11)

## 2025-08-10 ENCOUNTER — HEALTH MAINTENANCE LETTER (OUTPATIENT)
Age: 45
End: 2025-08-10

## (undated) DEVICE — PREP CHLORAPREP 26ML TINTED HI-LITE ORANGE 930815

## (undated) DEVICE — ENDO POUCH UNIV RETRIEVAL SYSTEM INZII 10MM CD001

## (undated) DEVICE — STPL ENDO LINEAR CUT ARTICULATING 45MM ATS45

## (undated) DEVICE — SUTURE VICRYL+ 0 27IN CT-1 UND VCP260H

## (undated) DEVICE — DECANTER VIAL 2006S

## (undated) DEVICE — ENDO TROCAR FIRST ENTRY KII FIOS Z-THRD 12X100MM CTF73

## (undated) DEVICE — SYR 30ML LL W/O NDL 302832

## (undated) DEVICE — TUBING SMOKE EVAC PNEUMOCLEAR HIGH FLOW 0620050250

## (undated) DEVICE — SOL WATER IRRIG 1000ML BOTTLE 2F7114

## (undated) DEVICE — STPL ENDO RELOAD 45X2.5MM VASC TR45W

## (undated) DEVICE — CUSTOM PACK LAP CHOLE SBA5BLCHEA

## (undated) DEVICE — ENDO TROCAR FIRST ENTRY KII FIOS Z-THRD 05X100MM CTF03

## (undated) DEVICE — PLATE GROUNDING ADULT W/CORD 9165L

## (undated) DEVICE — GLOVE BIOGEL PI SZ 8.0 40880

## (undated) DEVICE — ENDO TROCAR SLEEVE KII Z-THREADED 05X100MM CTS02

## (undated) DEVICE — SUCTION MANIFOLD NEPTUNE 2 SYS 1 PORT 702-025-000

## (undated) DEVICE — SUTURE VICRYL+ 4-0 UNDYED PS-2 VCP496H

## (undated) DEVICE — ESU CORD ACTIVE BLUE 92002

## (undated) RX ORDER — PROPOFOL 10 MG/ML
INJECTION, EMULSION INTRAVENOUS
Status: DISPENSED
Start: 2023-11-09

## (undated) RX ORDER — LIDOCAINE HYDROCHLORIDE 10 MG/ML
INJECTION, SOLUTION EPIDURAL; INFILTRATION; INTRACAUDAL; PERINEURAL
Status: DISPENSED
Start: 2023-11-09

## (undated) RX ORDER — FENTANYL CITRATE 50 UG/ML
INJECTION, SOLUTION INTRAMUSCULAR; INTRAVENOUS
Status: DISPENSED
Start: 2023-11-09

## (undated) RX ORDER — DEXAMETHASONE SODIUM PHOSPHATE 10 MG/ML
INJECTION, EMULSION INTRAMUSCULAR; INTRAVENOUS
Status: DISPENSED
Start: 2023-11-09

## (undated) RX ORDER — KETOROLAC TROMETHAMINE 30 MG/ML
INJECTION, SOLUTION INTRAMUSCULAR; INTRAVENOUS
Status: DISPENSED
Start: 2023-11-09

## (undated) RX ORDER — ONDANSETRON 2 MG/ML
INJECTION INTRAMUSCULAR; INTRAVENOUS
Status: DISPENSED
Start: 2023-11-09